# Patient Record
Sex: MALE | Race: WHITE | NOT HISPANIC OR LATINO | Employment: OTHER | URBAN - METROPOLITAN AREA
[De-identification: names, ages, dates, MRNs, and addresses within clinical notes are randomized per-mention and may not be internally consistent; named-entity substitution may affect disease eponyms.]

---

## 2024-03-15 ENCOUNTER — TELEPHONE (OUTPATIENT)
Dept: TRANSPLANT | Facility: CLINIC | Age: 59
End: 2024-03-15
Payer: MEDICARE

## 2024-03-15 NOTE — LETTER
March 15, 2024    Rosendo iNcholson  [address]  [address]  [facility] [address]  Phone: [phone]  Fax: [phone]          Dear Rosendo Nicholson:    Patient: Vimal Cruz   MR Number: [id number]   YOB: 1965     Thank you for the referral of Vimal Cruz to our lung transplant program. Your patients demographic and clinical information have been submitted for transplant provider review and financial clearance. Once the provider and insurance company has approved the consult for your patient, he will be contacted by our office re: the date for an appointment. We will update you once this initial appointment has been scheduled. You will receive an after-visit summary following the completion of your patients appointment in our clinic.    Thank you again for your trust in our program. If there is anything we can do for you or your staff, please feel free to contact us at [phone].    Sincerely,       Navya Brewer D.O.  Medical Director, Lung Transplant  Pulmonary & Critical Care Medicine    [facility]  [address] [place] [address]  [phone]

## 2024-03-19 ENCOUNTER — TELEPHONE (OUTPATIENT)
Dept: TRANSPLANT | Facility: CLINIC | Age: 59
End: 2024-03-19
Payer: MEDICARE

## 2024-03-19 DIAGNOSIS — J44.9 CHRONIC OBSTRUCTIVE PULMONARY DISEASE, UNSPECIFIED COPD TYPE: Primary | ICD-10-CM

## 2024-03-19 DIAGNOSIS — Z76.82 LUNG TRANSPLANT CANDIDATE: ICD-10-CM

## 2024-03-19 NOTE — TELEPHONE ENCOUNTER
"Received financial authorization from . Contacted patient about lung transplant referral, scheduling. Patient is interested and requests next available appointment. Offered , afternoon per patient request. Patient agreeable to this date. Patient is aware he will need to obtain 6MWT, and bring CD of CT chest to visit. Patient is aware that he will need to aim for a goal walk distance of 800 ft prior to being a candidate for lung transplant eval. Patient verbalized understanding. All questions answered at this time.  -----  Routed to  for authorization to proceed with consult, 6MWT.  ----- Message from Navya Brewer DO sent at 3/18/2024  8:28 AM CDT -----  Regarding: RE: LUT referral  Appropriate for LUT clinic.  Needs 6MWT to establish if he needs O2.  Will need a CD with CT images when he comes to clinic.  Thanks    ----- Message -----  From: Rochelle Suarez  Sent: 3/15/2024   4:49 PM CDT  To: Navya Brewer DO  Subject: LUT referral                                     Lung Transplant Referral Note     Referral from: Dr. Nicholson    Lung diagnosis: COPD     Age: 58 y.o. male    Height/Weight/BMI:  5' 10" / 177.5 lb / 80.5 kg  / 25.5 kg/m²     Smoking history: Social History     Tobacco Use       Smoking status: Former Smoker        Packs/day: 1        Years: 38        Pack years: 38        Types: Cigarettes        Quit date: 3/11/20         Years since quittin       Smokeless tobacco: Never User     Other Drug use: denies    PFT date: 3/8/24   FVC 3.19 (71.7%)  FEV1 1.35 (37.8%)  DLCO 12.45 (53.5%) TLC 7.22 (106%)     6 MWT date: 3/8/24   Distance 1242 ft / 378.56 meters. De-satted to 94% on room air with activity. Does not have portable Oxygen. # NOTE: [facility] records  note patient required 2L oxygen for use with 6MWT.      CXR date: 10/14/21 ([facility])    FINDINGS:   Hyperaeration with flattening of the hemidiaphragms and upper lung zone "   lucency compatible with COPD. No lobar consolidations, pleural   effusions, or pneumothorax     The heart is normal in size. Mediastinal contours are unremarkable.     ACDF hardware overlies the lower cervical spine, grossly intact where   seen. Multilevel anterior bony spurring within the thoracic spine. No   acute osseous abnormalities.     Chest CT date: 3/7/24   Findings:  Advanced emphysematous changes are evident. No evidence of malignancy. No discrete nodule is evident. Some bandlike scarring and discoid atelectatic changes noted  in the left upper lobe and in both anterior basilar lower lobes and the base of the right middle lobe. No evidence of pneumonia. No pleural or pericardial effusion. No adenopathy. Heart size normal. Coronary artery calcifications present.  Impression:   No evidence of malignancy, no nodules.     Echo date: 10/14/21 ([place])     Impression:   · Patient intermittently tachycardic during study.   · LVEF calculated as 73% using 2D biplane. LVEF is visually ~ 65 - 70%. LV   systolic function is normal. LV size is normal by linear dimension. LV   volume is normal. Normal LV wall thickness. LV wall motion normal. Normal   LV diastolic function.   · RV normal size. RV systolic function is normal.   · LA normal size.   · Tricuspid aortic valve with mildly thickened cusps. Aortic valve opens   well. No aortic regurgitation. No aortic valve stenosis.   · Normal right atrial pressure, estimated as 3 mmHg. Unable to assess RV   systolic pressure due to insufficient TR signal.   · Aortic root normal. Ascending aorta normal.   · No pericardial effusion.   · No prior TTE available for comparison.       Other pertinent medical history: History of a heart attack 2017 (has not had follow up); arthritis; atopic dermatitis on glans penis; severe itching in palms of hands. Established care 2/14/24 with Dr. Nicholson, previously established in [place] and [place], received Stem Cell Infusion per research study at  [facility].   No hypercapnia with ABG on room air (pCO2 30 - 3/8/24)    Recommendations?

## 2024-03-19 NOTE — LETTER
April 3, 2024    Rosendo Nicholson  [address]  [address]  [address]  [place] [address]  Phone: [phone]  Fax: [phone]     Gasper Hernandez  [address]  [facility] [address]  Phone: [phone]  Fax: [phone]        Dear Gasper Clark Vipul Hernandez:    Patient: Vimal Cruz   MR Number: [id number]   YOB: 1965     Thank you for the referral of Vimal Cruz to our lung transplant program. An initial appointment with the transplant team has been scheduled on April 22, 2024. You will receive an after-visit summary following the completion of your patients appointment in our clinic.    Thank you again for your trust in our program. If there is anything we can do for you or your staff, please feel free to contact us at [phone].    Sincerely,       Navya Brewer D.O.  Medical Director, Lung Transplant  Pulmonary & Critical Care Medicine    [facility]  [address] [place] [address]  [phone] tel  [phone] fax

## 2024-03-21 ENCOUNTER — OFFICE VISIT (OUTPATIENT)
Dept: FAMILY MEDICINE | Facility: CLINIC | Age: 59
End: 2024-03-21
Payer: MEDICARE

## 2024-03-21 ENCOUNTER — LAB VISIT (OUTPATIENT)
Dept: LAB | Facility: CLINIC | Age: 59
End: 2024-03-21
Payer: MEDICARE

## 2024-03-21 VITALS
DIASTOLIC BLOOD PRESSURE: 86 MMHG | BODY MASS INDEX: 24.72 KG/M2 | WEIGHT: 172.69 LBS | HEIGHT: 70 IN | OXYGEN SATURATION: 97 % | HEART RATE: 85 BPM | SYSTOLIC BLOOD PRESSURE: 130 MMHG

## 2024-03-21 DIAGNOSIS — J43.2 CENTRILOBULAR EMPHYSEMA: ICD-10-CM

## 2024-03-21 DIAGNOSIS — Z76.89 ENCOUNTER TO ESTABLISH CARE: ICD-10-CM

## 2024-03-21 DIAGNOSIS — Z12.5 PROSTATE CANCER SCREENING: ICD-10-CM

## 2024-03-21 DIAGNOSIS — E78.2 MIXED HYPERLIPIDEMIA: ICD-10-CM

## 2024-03-21 DIAGNOSIS — I10 PRIMARY HYPERTENSION: ICD-10-CM

## 2024-03-21 DIAGNOSIS — Z11.59 NEED FOR HEPATITIS C SCREENING TEST: ICD-10-CM

## 2024-03-21 DIAGNOSIS — Z11.4 ENCOUNTER FOR SCREENING FOR HIV: ICD-10-CM

## 2024-03-21 DIAGNOSIS — K42.9 UMBILICAL HERNIA WITHOUT OBSTRUCTION AND WITHOUT GANGRENE: ICD-10-CM

## 2024-03-21 DIAGNOSIS — J43.2 CENTRILOBULAR EMPHYSEMA: Primary | ICD-10-CM

## 2024-03-21 PROBLEM — I25.10 CAD (CORONARY ARTERY DISEASE): Status: ACTIVE | Noted: 2017-08-07

## 2024-03-21 PROBLEM — I25.2 HISTORY OF NON-ST ELEVATION MYOCARDIAL INFARCTION (NSTEMI): Status: ACTIVE | Noted: 2017-05-26

## 2024-03-21 PROBLEM — N13.8 BENIGN PROSTATIC HYPERPLASIA WITH URINARY OBSTRUCTION: Status: ACTIVE | Noted: 2017-08-11

## 2024-03-21 PROBLEM — F32.A CHRONIC DEPRESSION: Status: ACTIVE | Noted: 2018-04-20

## 2024-03-21 PROBLEM — N52.9 ERECTILE DYSFUNCTION: Status: ACTIVE | Noted: 2017-08-11

## 2024-03-21 PROBLEM — N40.1 BENIGN PROSTATIC HYPERPLASIA WITH URINARY OBSTRUCTION: Status: ACTIVE | Noted: 2017-08-11

## 2024-03-21 LAB
ALBUMIN SERPL BCP-MCNC: 4.1 G/DL (ref 3.5–5.2)
ALP SERPL-CCNC: 81 U/L (ref 55–135)
ALT SERPL W/O P-5'-P-CCNC: 21 U/L (ref 10–44)
ANION GAP SERPL CALC-SCNC: 10 MMOL/L (ref 8–16)
AST SERPL-CCNC: 17 U/L (ref 10–40)
BASOPHILS # BLD AUTO: 0.1 K/UL (ref 0–0.2)
BASOPHILS NFR BLD: 1.3 % (ref 0–1.9)
BILIRUB SERPL-MCNC: 0.8 MG/DL (ref 0.1–1)
BUN SERPL-MCNC: 13 MG/DL (ref 6–20)
CALCIUM SERPL-MCNC: 9.4 MG/DL (ref 8.7–10.5)
CHLORIDE SERPL-SCNC: 107 MMOL/L (ref 95–110)
CHOLEST SERPL-MCNC: 144 MG/DL (ref 120–199)
CHOLEST/HDLC SERPL: 3 {RATIO} (ref 2–5)
CO2 SERPL-SCNC: 23 MMOL/L (ref 23–29)
COMPLEXED PSA SERPL-MCNC: 1.4 NG/ML (ref 0–4)
CREAT SERPL-MCNC: 1.1 MG/DL (ref 0.5–1.4)
DIFFERENTIAL METHOD BLD: NORMAL
EOSINOPHIL # BLD AUTO: 0.1 K/UL (ref 0–0.5)
EOSINOPHIL NFR BLD: 1.4 % (ref 0–8)
ERYTHROCYTE [DISTWIDTH] IN BLOOD BY AUTOMATED COUNT: 13.3 % (ref 11.5–14.5)
EST. GFR  (NO RACE VARIABLE): >60 ML/MIN/1.73 M^2
GLUCOSE SERPL-MCNC: 96 MG/DL (ref 70–110)
HCT VFR BLD AUTO: 47.9 % (ref 40–54)
HDLC SERPL-MCNC: 48 MG/DL (ref 40–75)
HDLC SERPL: 33.3 % (ref 20–50)
HGB BLD-MCNC: 16.3 G/DL (ref 14–18)
IMM GRANULOCYTES # BLD AUTO: 0.01 K/UL (ref 0–0.04)
IMM GRANULOCYTES NFR BLD AUTO: 0.1 % (ref 0–0.5)
LDLC SERPL CALC-MCNC: 80.4 MG/DL (ref 63–159)
LYMPHOCYTES # BLD AUTO: 2.6 K/UL (ref 1–4.8)
LYMPHOCYTES NFR BLD: 33.1 % (ref 18–48)
MCH RBC QN AUTO: 30.9 PG (ref 27–31)
MCHC RBC AUTO-ENTMCNC: 34 G/DL (ref 32–36)
MCV RBC AUTO: 91 FL (ref 82–98)
MONOCYTES # BLD AUTO: 0.6 K/UL (ref 0.3–1)
MONOCYTES NFR BLD: 7.8 % (ref 4–15)
NEUTROPHILS # BLD AUTO: 4.5 K/UL (ref 1.8–7.7)
NEUTROPHILS NFR BLD: 56.3 % (ref 38–73)
NONHDLC SERPL-MCNC: 96 MG/DL
NRBC BLD-RTO: 0 /100 WBC
PLATELET # BLD AUTO: 309 K/UL (ref 150–450)
PMV BLD AUTO: 10.6 FL (ref 9.2–12.9)
POTASSIUM SERPL-SCNC: 4.3 MMOL/L (ref 3.5–5.1)
PROT SERPL-MCNC: 7.2 G/DL (ref 6–8.4)
RBC # BLD AUTO: 5.27 M/UL (ref 4.6–6.2)
SODIUM SERPL-SCNC: 140 MMOL/L (ref 136–145)
TRIGL SERPL-MCNC: 78 MG/DL (ref 30–150)
WBC # BLD AUTO: 7.95 K/UL (ref 3.9–12.7)

## 2024-03-21 PROCEDURE — 84153 ASSAY OF PSA TOTAL: CPT | Mod: TXP | Performed by: STUDENT IN AN ORGANIZED HEALTH CARE EDUCATION/TRAINING PROGRAM

## 2024-03-21 PROCEDURE — 99214 OFFICE O/P EST MOD 30 MIN: CPT | Mod: S$GLB,,, | Performed by: STUDENT IN AN ORGANIZED HEALTH CARE EDUCATION/TRAINING PROGRAM

## 2024-03-21 PROCEDURE — 80053 COMPREHEN METABOLIC PANEL: CPT | Mod: TXP | Performed by: STUDENT IN AN ORGANIZED HEALTH CARE EDUCATION/TRAINING PROGRAM

## 2024-03-21 PROCEDURE — 87389 HIV-1 AG W/HIV-1&-2 AB AG IA: CPT | Mod: TXP | Performed by: STUDENT IN AN ORGANIZED HEALTH CARE EDUCATION/TRAINING PROGRAM

## 2024-03-21 PROCEDURE — 36415 COLL VENOUS BLD VENIPUNCTURE: CPT | Mod: TXP,,, | Performed by: STUDENT IN AN ORGANIZED HEALTH CARE EDUCATION/TRAINING PROGRAM

## 2024-03-21 PROCEDURE — 80061 LIPID PANEL: CPT | Mod: TXP | Performed by: STUDENT IN AN ORGANIZED HEALTH CARE EDUCATION/TRAINING PROGRAM

## 2024-03-21 PROCEDURE — 86803 HEPATITIS C AB TEST: CPT | Mod: TXP | Performed by: STUDENT IN AN ORGANIZED HEALTH CARE EDUCATION/TRAINING PROGRAM

## 2024-03-21 PROCEDURE — 85025 COMPLETE CBC W/AUTO DIFF WBC: CPT | Mod: TXP | Performed by: STUDENT IN AN ORGANIZED HEALTH CARE EDUCATION/TRAINING PROGRAM

## 2024-03-21 RX ORDER — ATORVASTATIN CALCIUM 20 MG/1
20 TABLET, FILM COATED ORAL
COMMUNITY
Start: 2023-03-01 | End: 2024-04-24 | Stop reason: ALTCHOICE

## 2024-03-21 RX ORDER — FLUTICASONE FUROATE AND VILANTEROL TRIFENATATE 100; 25 UG/1; UG/1
1 POWDER RESPIRATORY (INHALATION) DAILY
COMMUNITY
Start: 2023-10-01 | End: 2024-04-30

## 2024-03-21 RX ORDER — RAMIPRIL 2.5 MG/1
2.5 CAPSULE ORAL DAILY
COMMUNITY
Start: 2017-05-20

## 2024-03-21 RX ORDER — DILTIAZEM HYDROCHLORIDE 120 MG/1
120 CAPSULE, EXTENDED RELEASE ORAL
COMMUNITY
Start: 2017-05-20 | End: 2024-03-26

## 2024-03-21 RX ORDER — BUSPIRONE HYDROCHLORIDE 15 MG/1
7.5 TABLET ORAL 3 TIMES DAILY PRN
COMMUNITY
Start: 2023-10-01

## 2024-03-21 RX ORDER — AZELASTINE 1 MG/ML
2 SPRAY, METERED NASAL NIGHTLY PRN
COMMUNITY
Start: 2023-10-01

## 2024-03-21 NOTE — PROGRESS NOTES
"  [facility]  [address]  [place] [address]    Subjective     Patient ID: Vimal Cruz is a 58 y.o. male who comes to the clinic to establish care.    Chief Complaint: Establish Care    HTN - on ramipril and diltiazem    DLD - on atorvastatin    COPD - on breo-ellipta 1 puff daily, has rescue inhaler, sees pulmonology w/ Dr. Pathak w/ [facility]. Has been told by him that he will refer the patient to [facility] for a possible lung transplant. He isnt on any supplemental O2 or other inhalers so I'm not sure why he was told this, he says he might want another pulmnologist    Anixety - takes buspar 15mg    History of NSTEMI - happened years ago in 2017, on statin but not baby aspirin    CAD - on statin    ROS negative unless stated above       Objective     Vitals:    03/21/24 1111   BP: 130/86   BP Location: Left arm   Patient Position: Sitting   BP Method: Large (Manual)   Pulse: 85   SpO2: 97%   Weight: 78.3 kg (172 lb 11.2 oz)   Height: 5' 10" (1.778 m)        Wt Readings from Last 3 Encounters:   03/21/24 1111 78.3 kg (172 lb 11.2 oz)        Physical Exam  Vitals reviewed.   Constitutional:       Appearance: Normal appearance.   HENT:      Head: Normocephalic and atraumatic.   Eyes:      Extraocular Movements: Extraocular movements intact.      Pupils: Pupils are equal, round, and reactive to light.   Cardiovascular:      Rate and Rhythm: Normal rate.      Pulses: Normal pulses.      Heart sounds: Normal heart sounds.   Pulmonary:      Effort: Pulmonary effort is normal. No respiratory distress.      Breath sounds: Normal breath sounds.   Musculoskeletal:         General: Normal range of motion.      Cervical back: Normal range of motion and neck supple.   Skin:     General: Skin is dry.      Capillary Refill: Capillary refill takes less than 2 seconds.   Neurological:      General: No focal deficit present.      Mental Status: He is " alert and oriented to person, place, and time. Mental status is at baseline.   Psychiatric:         Mood and Affect: Mood normal.         Behavior: Behavior normal.         Thought Content: Thought content normal.         Current Outpatient Medications   Medication Instructions    atorvastatin (LIPITOR) 20 mg    azelastine (ASTELIN) 137 mcg (0.1 %) nasal spray 2 sprays    BREO ELLIPTA 100-25 mcg/dose diskus inhaler 1 puff    busPIRone (BUSPAR) 15 mg    diltiaZEM HCl (TIAZAC) 120 mg, Oral    ramipriL (ALTACE) 2.5 mg, Daily           Assessment and Plan     1. Centrilobular emphysema  -     Comprehensive metabolic panel; Future; Expected date: 03/21/2024  -     CBC auto differential; Future; Expected date: 03/21/2024    2. Encounter to establish care    3. Mixed hyperlipidemia  -     Lipid panel; Future; Expected date: 03/21/2024    4. Prostate cancer screening  -     PSA, Screening; Future; Expected date: 03/21/2024    5. Encounter for screening for HIV  -     HIV 1/2 Ag/Ab (4th Gen); Future; Expected date: 03/21/2024    6. Need for hepatitis C screening test  -     Hepatitis C antibody; Future; Expected date: 03/21/2024    7. Primary hypertension    8. Umbilical hernia without obstruction and without gangrene  -     Ambulatory referral/consult to General Surgery; Future; Expected date: 03/28/2024        Here to establish care    For HTN, continue BP regimen    For DLD, continue statin    For COPD, stable today, may request a new pulmnologist as he doesn't think he needs a lung transplant    Referring to general surgery for umbilical hernia    Labs today as above    RTC in 6 months         I encouraged the patient to take all medications as prescribed and to keep follow up appointments with their providers. Patient stated they had no other concerns. Questions were invited and answered. Follow up sooner if needed.     Myron Duarte MD  03/21/2024 11:03 AM

## 2024-03-22 LAB
HCV AB SERPL QL IA: NORMAL
HIV 1+2 AB+HIV1 P24 AG SERPL QL IA: NORMAL

## 2024-03-26 ENCOUNTER — OFFICE VISIT (OUTPATIENT)
Dept: SURGERY | Facility: CLINIC | Age: 59
End: 2024-03-26
Payer: MEDICARE

## 2024-03-26 VITALS
DIASTOLIC BLOOD PRESSURE: 93 MMHG | SYSTOLIC BLOOD PRESSURE: 130 MMHG | BODY MASS INDEX: 24.62 KG/M2 | WEIGHT: 172 LBS | HEART RATE: 69 BPM | HEIGHT: 70 IN

## 2024-03-26 DIAGNOSIS — J43.2 CENTRILOBULAR EMPHYSEMA: Primary | ICD-10-CM

## 2024-03-26 DIAGNOSIS — K42.9 UMBILICAL HERNIA WITHOUT OBSTRUCTION AND WITHOUT GANGRENE: ICD-10-CM

## 2024-03-26 PROCEDURE — 99999 PR PBB SHADOW E&M-EST. PATIENT-LVL IV: CPT | Mod: PBBFAC,TXP,, | Performed by: SPECIALIST

## 2024-03-26 PROCEDURE — 99214 OFFICE O/P EST MOD 30 MIN: CPT | Mod: PBBFAC,NTX | Performed by: SPECIALIST

## 2024-03-26 PROCEDURE — 99203 OFFICE O/P NEW LOW 30 MIN: CPT | Mod: S$PBB,NTX,, | Performed by: SPECIALIST

## 2024-03-26 RX ORDER — DILTIAZEM HYDROCHLORIDE 120 MG/1
120 CAPSULE, COATED, EXTENDED RELEASE ORAL NIGHTLY
COMMUNITY
Start: 2024-02-19

## 2024-03-26 NOTE — PROGRESS NOTES
"Subjective     Patient ID: Vimal Cruz  is a 58 y.o. male     Chief Complaint:   Chief Complaint   Patient presents with    Hernia     Umbilical      Vitals:    03/26/24 1257   BP: (!) 130/93   Pulse: 69   Weight: 78 kg (172 lb)   Height: 5' 10" (1.778 m)       HPI     Hernia     Additional comments: Umbilical          Last edited by Brittney Ly LPN on 3/26/2024 12:57 PM.      Very pleasant 58-year-old male who presents with a reducible small umbilical hernia states has been present for several years.  Not causing significant pain or discomfort but causes discomfort when it is pressed upon.  He reports it is easily reducible.  No previous surgical history on his abdomen.  Patient has a significant history of coronary and pulmonary disease.  Reports he has had myocardial infarction in the past but no stenting.  He is significant smoker in the past but quit smoking several years ago.  Positive 40 pack year history.  Also has significant pulmonary history and was under care of pulmonologist in [place] and is followed by Pulmonary here on the [facility].  By history he reports he was referred for evaluation by Pulmonary transplant.  Referred to Surgical Services for evaluation for repair of his umbilical hernia elective repair.  Past Medical History:   Diagnosis Date    Hypertension      Past Surgical History:   Procedure Laterality Date    HERNIA REPAIR       Family History   Problem Relation Age of Onset    Dementia Mother     Emphysema Maternal Grandmother     Throat cancer Maternal Grandfather     Dementia Paternal Grandmother     Stroke Paternal Grandfather      Past Surgical History:   Procedure Laterality Date    HERNIA REPAIR       Social History     Socioeconomic History    Marital status:    Tobacco Use    Smoking status: Never    Smokeless tobacco: Never   Substance and Sexual Activity    Alcohol use: Not Currently    Drug use: Never    Sexual activity: Yes     Partners: Female        Current " Outpatient Medications:     atorvastatin (LIPITOR) 20 MG tablet, 20 mg., Disp: , Rfl:     azelastine (ASTELIN) 137 mcg (0.1 %) nasal spray, 2 sprays., Disp: , Rfl:     BREO ELLIPTA 100-25 mcg/dose diskus inhaler, 1 puff., Disp: , Rfl:     busPIRone (BUSPAR) 15 MG tablet, 15 mg., Disp: , Rfl:     diltiaZEM (CARDIZEM CD) 120 MG Cp24, Take 120 mg by mouth., Disp: , Rfl:     ramipriL (ALTACE) 2.5 MG capsule, 2.5 mg once daily., Disp: , Rfl:    Review of patient's allergies indicates:   Allergen Reactions    Amoxicillin     Penicillin             Review of Systems   Constitutional: Negative.    HENT: Negative.     Eyes: Negative.    Respiratory:  Positive for shortness of breath.    Cardiovascular: Negative.    Gastrointestinal: Negative.    Genitourinary: Negative.    Musculoskeletal: Negative.         Positive umbilical hernia no change in bowel or bladder habits   Skin: Negative.    Neurological: Negative.    Endo/Heme/Allergies: Negative.    Psychiatric/Behavioral: Negative.        I have reviewed the following:     Details / Date    []   Labs     []   Micro     []   Pathology     []   Imaging     []   Cardiology Procedures     [x]   Other Evaluation of primary care and referral notes        Objective         Physical Exam  Vitals and nursing note reviewed.   Constitutional:       Appearance: Normal appearance. He is normal weight.   HENT:      Head: Normocephalic and atraumatic.      Nose: Nose normal.      Mouth/Throat:      Mouth: Mucous membranes are moist.   Eyes:      Extraocular Movements: Extraocular movements intact.      Pupils: Pupils are equal, round, and reactive to light.   Cardiovascular:      Rate and Rhythm: Normal rate.   Pulmonary:      Effort: Pulmonary effort is normal.   Abdominal:      General: Abdomen is flat.      Palpations: Abdomen is soft.      Hernia: A hernia is present.          Comments: Easily reducible small umbilical hernia   Musculoskeletal:         General: Normal range of motion.       Cervical back: Normal range of motion.   Skin:     General: Skin is warm.   Neurological:      General: No focal deficit present.      Mental Status: He is alert and oriented to person, place, and time. Mental status is at baseline.   Psychiatric:         Mood and Affect: Mood normal.         Behavior: Behavior normal.        Assessment and Plan     1. Umbilical hernia without obstruction and without gangrene  -     Ambulatory referral/consult to General Surgery       No orders of the defined types were placed in this encounter.     Patient is a 58-year-old male with significant past coronary and pulmonary disease who presents with a reducible umbilical hernia causing mild discomfort.  Discussed risks and benefits of surgery as well as inherent risks secondary to his underlying cardiac and pulmonary issues.  Recommended referral to both Cardiology and Pulmonary for perioperative risk assessment.  Patient is to return to clinic following completion of those evaluations by the 2 referrals.  Discussed outpatient surgical procedure laparoscopic for repair of this umbilical hernia.  Risks and benefits have been explained to the patient he states he understands.  He agrees with the current plan of referral for perioperative risk assessment by the 2 referrals subspecialist.  An After Visit Summary was printed and given to the patient.       Roel Bonilla MD  [facility] 2[facility] General [facility]  [address] [place] [address],[place] [address]  [phone]     This note was created using [employer] direct voice recognition software. Note may have occasional typographical errors that may not have been identified and edited despite initial review prior to signing.

## 2024-04-03 ENCOUNTER — TELEPHONE (OUTPATIENT)
Dept: CARDIOLOGY | Facility: CLINIC | Age: 59
End: 2024-04-03
Payer: MEDICARE

## 2024-04-03 NOTE — TELEPHONE ENCOUNTER
----- Message from Starla Thomason sent at 4/3/2024  3:02 PM CDT -----  Contact: self  Type:  Needs Medical Advice, SOONER APPT REQUEST    Who Called: self  Symptoms (please be specific): is requesting to se seen sooner than June, pt had sadia ppt on April 10th but had to cancel..    Would the patient rather a call back or a response via [facility]? call  Best Call Back Number: [phone] (home) [phone] (work)    Additional Information: please advise and thank you.

## 2024-04-04 ENCOUNTER — TELEPHONE (OUTPATIENT)
Dept: CARDIOLOGY | Facility: CLINIC | Age: 59
End: 2024-04-04
Payer: MEDICARE

## 2024-04-04 NOTE — TELEPHONE ENCOUNTER
----- Message from Aleta Howell sent at 4/4/2024  8:21 AM CDT -----  Contact: Patient  Type:  Patient Returning Call    Who Called:  Patient  Who Left Message for Patient:  Milla  Does the patient know what this is regarding?:  Sooner appointment    Would the patient rather a call back or a response via [facility]?   Call back  Best Call Back Number:  [phone]    Additional Information:   States he is returning a missed call - please call back - thank you

## 2024-04-04 NOTE — TELEPHONE ENCOUNTER
Spoke to pt unable to unable to give sooner appointment.  Instructed to ER for any emergency conditions,   verbalized understanding.

## 2024-04-18 ENCOUNTER — OFFICE VISIT (OUTPATIENT)
Dept: FAMILY MEDICINE | Facility: CLINIC | Age: 59
End: 2024-04-18
Payer: MEDICARE

## 2024-04-18 VITALS
DIASTOLIC BLOOD PRESSURE: 78 MMHG | SYSTOLIC BLOOD PRESSURE: 134 MMHG | BODY MASS INDEX: 24.62 KG/M2 | OXYGEN SATURATION: 98 % | HEART RATE: 73 BPM | RESPIRATION RATE: 20 BRPM | WEIGHT: 172 LBS | HEIGHT: 70 IN

## 2024-04-18 DIAGNOSIS — J43.2 CENTRILOBULAR EMPHYSEMA: Primary | ICD-10-CM

## 2024-04-18 DIAGNOSIS — I25.10 CORONARY ARTERY DISEASE, UNSPECIFIED VESSEL OR LESION TYPE, UNSPECIFIED WHETHER ANGINA PRESENT, UNSPECIFIED WHETHER NATIVE OR TRANSPLANTED HEART: ICD-10-CM

## 2024-04-18 DIAGNOSIS — E78.2 MIXED HYPERLIPIDEMIA: ICD-10-CM

## 2024-04-18 DIAGNOSIS — I10 PRIMARY HYPERTENSION: ICD-10-CM

## 2024-04-18 PROCEDURE — 99214 OFFICE O/P EST MOD 30 MIN: CPT | Mod: S$GLB,,, | Performed by: STUDENT IN AN ORGANIZED HEALTH CARE EDUCATION/TRAINING PROGRAM

## 2024-04-18 RX ORDER — CEFDINIR 300 MG/1
300 CAPSULE ORAL 2 TIMES DAILY
Qty: 10 CAPSULE | Refills: 0 | Status: SHIPPED | OUTPATIENT
Start: 2024-04-18 | End: 2024-04-23

## 2024-04-18 RX ORDER — PREDNISONE 20 MG/1
20 TABLET ORAL DAILY
Qty: 5 TABLET | Refills: 0 | Status: SHIPPED | OUTPATIENT
Start: 2024-04-18 | End: 2024-04-29

## 2024-04-18 NOTE — PROGRESS NOTES
"  [facility]    [facility]  [address]  [place] [address]    Subjective     Patient ID: Vimal Cruz is a 58 y.o. male who comes to the clinic for a follow up visit.    Chief Complaint: Establish Care (C/O sinus infection /sob)    Cough - about a week ago, he started to get really nasal drainage. In the last few days he's had a sore throat and chest burning. No fever or worsening SOB.     HTN - on ramipril and diltiazem     DLD - on atorvastatin     COPD - on breo-ellipta 1 puff daily, has rescue inhaler, sees pulmonology w/ Dr. Pathak w/ [facility] but is set to see a new pulmonologist in [place]. Has been told by him that he will refer the patient to [facility] for a possible lung transplant. He isnt on any supplemental O2 or other inhalers so I'm not sure why he was told this     Anixety - takes buspar 15mg     History of NSTEMI - happened years ago in 2017, on statin but not baby aspirin     CAD - on statin    ROS negative unless stated above       Objective     Vitals:    04/18/24 1135   BP: 134/78   BP Location: Right arm   Patient Position: Sitting   BP Method: Medium (Manual)   Pulse: 73   Resp: 20   SpO2: 98%   Weight: 78 kg (172 lb)   Height: 5' 10" (1.778 m)        Wt Readings from Last 3 Encounters:   04/18/24 1135 78 kg (172 lb)   03/26/24 1257 78 kg (172 lb)   03/21/24 1111 78.3 kg (172 lb 11.2 oz)        Physical Exam  Vitals reviewed.   Constitutional:       Appearance: Normal appearance.   HENT:      Head: Normocephalic and atraumatic.   Eyes:      Extraocular Movements: Extraocular movements intact.      Pupils: Pupils are equal, round, and reactive to light.   Cardiovascular:      Rate and Rhythm: Normal rate.      Pulses: Normal pulses.      Heart sounds: Normal heart sounds.   Pulmonary:      Effort: Pulmonary effort is normal. No respiratory distress.      Breath sounds: Normal breath sounds.   Musculoskeletal:         General: " Normal range of motion.      Cervical back: Normal range of motion and neck supple.   Skin:     General: Skin is dry.      Capillary Refill: Capillary refill takes less than 2 seconds.   Neurological:      General: No focal deficit present.      Mental Status: He is alert and oriented to person, place, and time. Mental status is at baseline.   Psychiatric:         Mood and Affect: Mood normal.         Behavior: Behavior normal.         Thought Content: Thought content normal.         Current Outpatient Medications   Medication Instructions    atorvastatin (LIPITOR) 20 mg    azelastine (ASTELIN) 137 mcg (0.1 %) nasal spray 2 sprays    BREO ELLIPTA 100-25 mcg/dose diskus inhaler 1 puff    busPIRone (BUSPAR) 15 mg    cefdinir (OMNICEF) 300 mg, Oral, 2 times daily    diltiaZEM (CARDIZEM CD) 120 mg, Oral    predniSONE (DELTASONE) 20 mg, Oral, Daily    ramipriL (ALTACE) 2.5 mg, Daily           Assessment and Plan     1. Centrilobular emphysema  -     cefdinir (OMNICEF) 300 MG capsule; Take 1 capsule (300 mg total) by mouth 2 (two) times daily. for 5 days  Dispense: 10 capsule; Refill: 0  -     predniSONE (DELTASONE) 20 MG tablet; Take 1 tablet (20 mg total) by mouth once daily.  Dispense: 5 tablet; Refill: 0    2. Primary hypertension    3. Mixed hyperlipidemia    4. Coronary artery disease, unspecified vessel or lesion type, unspecified whether angina present, unspecified whether native or transplanted heart        Here for follow up    Treating COPD exacerbation w/ cefdinir and prednisone.     For HTN, at goal today, continue current meds    For DLD, continue statin    For COPD, continue chronic meds as is, has f/u w/ lung transplant team on the 4/22 w/ Dr. Brewer and then new pulmonologist on 4/30 w/ Dr. Hernandez    RTC in 5 months for regularly scheduled visit or sooner if needed         I encouraged the patient to take all medications as prescribed and to keep follow up appointments with their providers. Patient  stated they had no other concerns. Questions were invited and answered. Follow up sooner if needed.     Myron Duarte MD  04/18/2024 11:43 AM

## 2024-04-19 ENCOUNTER — TELEPHONE (OUTPATIENT)
Dept: TRANSPLANT | Facility: CLINIC | Age: 59
End: 2024-04-19
Payer: MEDICARE

## 2024-04-22 ENCOUNTER — OFFICE VISIT (OUTPATIENT)
Dept: TRANSPLANT | Facility: CLINIC | Age: 59
End: 2024-04-22
Payer: MEDICARE

## 2024-04-22 ENCOUNTER — HOSPITAL ENCOUNTER (OUTPATIENT)
Dept: PULMONOLOGY | Facility: CLINIC | Age: 59
Discharge: HOME | End: 2024-04-22
Payer: MEDICARE

## 2024-04-22 VITALS
OXYGEN SATURATION: 97 % | BODY MASS INDEX: 25.48 KG/M2 | WEIGHT: 172 LBS | TEMPERATURE: 99 F | DIASTOLIC BLOOD PRESSURE: 90 MMHG | RESPIRATION RATE: 16 BRPM | HEIGHT: 69 IN | SYSTOLIC BLOOD PRESSURE: 157 MMHG | HEART RATE: 74 BPM

## 2024-04-22 VITALS — BODY MASS INDEX: 25.48 KG/M2 | WEIGHT: 172 LBS | HEIGHT: 69 IN

## 2024-04-22 DIAGNOSIS — Z76.82 LUNG TRANSPLANT CANDIDATE: ICD-10-CM

## 2024-04-22 DIAGNOSIS — J44.9 CHRONIC OBSTRUCTIVE PULMONARY DISEASE, UNSPECIFIED COPD TYPE: Primary | ICD-10-CM

## 2024-04-22 DIAGNOSIS — J44.9 CHRONIC OBSTRUCTIVE PULMONARY DISEASE, UNSPECIFIED COPD TYPE: ICD-10-CM

## 2024-04-22 PROCEDURE — 99204 OFFICE O/P NEW MOD 45 MIN: CPT | Mod: 25,S$PBB,TXP, | Performed by: PHYSICIAN ASSISTANT

## 2024-04-22 PROCEDURE — 99999 PR PBB SHADOW E&M-EST. PATIENT-LVL IV: CPT | Mod: PBBFAC,TXP,, | Performed by: PHYSICIAN ASSISTANT

## 2024-04-22 PROCEDURE — 94618 PULMONARY STRESS TESTING: CPT | Mod: 26,S$PBB,TXP, | Performed by: INTERNAL MEDICINE

## 2024-04-22 PROCEDURE — 94618 PULMONARY STRESS TESTING: CPT | Mod: PBBFAC,TXP | Performed by: INTERNAL MEDICINE

## 2024-04-22 PROCEDURE — 99214 OFFICE O/P EST MOD 30 MIN: CPT | Mod: PBBFAC,TXP,25 | Performed by: PHYSICIAN ASSISTANT

## 2024-04-22 RX ORDER — CETIRIZINE HYDROCHLORIDE 10 MG/1
10 TABLET, CHEWABLE ORAL NIGHTLY
COMMUNITY

## 2024-04-22 RX ORDER — FLUTICASONE PROPIONATE 50 MCG
1 SPRAY, SUSPENSION (ML) NASAL DAILY
COMMUNITY
End: 2024-04-29 | Stop reason: SDUPTHER

## 2024-04-22 RX ORDER — FLUTICASONE PROPIONATE AND SALMETEROL 250; 50 UG/1; UG/1
1 POWDER RESPIRATORY (INHALATION) 2 TIMES DAILY
COMMUNITY
Start: 2024-04-18 | End: 2024-04-30

## 2024-04-22 NOTE — LETTER
April 22, 2024        Rosendo Nicholson  [address]  [address]  [address]  [place] [address]  Phone: [phone]  Fax: [phone]             [facility] 1st Fl  [address]  [facility] [address]  Phone: [phone]   Patient: Vimal Cruz   MR Number: [id number]   YOB: 1965   Date of Visit: 4/22/2024       Dear Dr. Rosendo Nicholson    Thank you for referring Vimal Cruz to me for evaluation. Attached you will find relevant portions of my assessment and plan of care.    If you have questions, please do not hesitate to call me. I look forward to following Vimal Cruz along with you.    Sincerely,    RAO Campos    If you would like to receive this communication electronically, please contact [email or web address] or [phone] to request [employer] Link access.    [employer] is a tool which provides read-only access to select patient information with whom you have a relationship. Its easy to use and provides real time access to review your patients record including encounter summaries, notes, results, and demographic information.    If you feel you have received this communication in error or would no longer like to receive these types of communications, please e-mail [email or web address]

## 2024-04-22 NOTE — PROCEDURES
Vimal Cruz is a 58 y.o.  male patient, who presents for a 6 minute walk test ordered by DO Osman.  The diagnosis is Pre Lung Transplant Evaluation, COPD/Emphysema.  The patient's BMI is 25.4 kg/m2.  Predicted distance (lower limit of normal) is 428.11 meters.      Test Results:    The test was completed without stopping.  The total time walked was 360 seconds.  During walking, the patient reported:  Dyspnea. The patient used no assistive devices during testing.     04/22/2024---------Distance: 426.72 meters (1400 feet)     Lap Walk Time O2 Sat % Supplemental Oxygen Heart Rate Blood Pressure Selena Scale   Pre-exercise  (Resting) 0 0 98 % Room Air 74 bpm 158/88 mmHg 0.5   During Exercise 1 51 sec 98 % Room Air 101 bpm     During Exercise 2 100 sec 94 % Room Air 104 bpm     During Exercise 3 150 sec 92 % Room Air 111 bpm     During Exercise 4 200 sec 93 % Room Air 110 bpm     During Exercise 5 260 sec 93 % Room Air 112 bpm     During Exercise 6 302 sec 93 % Room Air 114 bpm     End of Exercise 7 360 sec 93 % Room Air 118 bpm 195/95 mmHg 4   Post-exercise  (Recovery)   97 % Room Air  83 bpm 167/97 mmHg      Recovery Time: 103 seconds    Performing nurse/tech: Yasmeen NANCE      PREVIOUS STUDY:   The patient has not had a previous study.      CLINICAL INTERPRETATION:  Six minute walk distance is 426.72 meters (1400 feet) with somewhat heavy dyspnea.  During exercise, there was significant desaturation while breathing room air.  Both blood pressure and heart rate increased significantly with walking.  Hypertension was present prior to exercise.  The patient did not report non-pulmonary symptoms during exercise.  No previous study performed.  Based upon age and body mass index, exercise capacity is less than predicted.

## 2024-04-22 NOTE — PROGRESS NOTES
"LUNG TRANSPLANT INITIAL EVALUATION                                                                                                                                             Reason for Visit:  Evaluation for lung transplant    Referring Physician: Rosendo Nicholson MD    History of Present Illness: Vimal Cruz is a 58 y.o. male who is on 0L of oxygen.  He is on no assisted ventilation.  His New York Heart Association Class is I and a Karnofsky score of 90% - Able to carry on normal activity: minor symptoms of disease. He is not diabetic.    Requires Supplemental O2: No    Massive Hemoptysis: 0 occurrences  (Enter the number of times in the last year)    Exacerbations: 0 occurrences  (Enter the number of times in the last year)    Microbiology Infections: No    Patient presents today for evaluation of lung transplant candidacy. He has a history of COPD based upon his PFTs and imaging. Has followed with Dr. Nicholson and is on Breo, ICS/LABA for management of his COPD. He states he has had progressive fatigue and has been performing his ADLs slower than previous. No history of exacerbations, hospitalizations, ICU admissions. No prior lung biopsies or chest surgeries. Reported that he was prescribed oxygen by a pulmonologist at OSH about two years prior, but never used it.     Patient is a former smoker with 30 pack year history. Quit 4 years ago. He has a history of MI in 2017 (no intervention per patient). No cardiology follow up. He states he is awaiting cardiology clearance for possible procedure. No other known past medical history. Previously worked as a  and with refrigerators. Unclear occupational exposure history in the past. Patient states he has a lot of anxiety related to his lung disease. Independent with his ADLs and remains very active. Walks his dog and cycles most days.     Past Medical History:   Diagnosis Date    Anxiety     Arthritis     "degenerative bone disease"    COPD " [facility] 6 months w/ labs (CBC, CMP, TSH) and CT CAP.    Girish Sauer MD.   (chronic obstructive pulmonary disease)     Emphysema of lung     Hypertension        Past Surgical History:   Procedure Laterality Date    ANTERIOR SPINAL FUSION  2016    c5    HERNIA REPAIR         Allergies: Amoxicillin and Penicillin    Current Outpatient Medications   Medication Sig Dispense Refill    atorvastatin (LIPITOR) 20 MG tablet 20 mg.      azelastine (ASTELIN) 137 mcg (0.1 %) nasal spray 2 sprays by Nasal route nightly as needed for Rhinitis.      BREO ELLIPTA 100-25 mcg/dose diskus inhaler Inhale 1 puff into the lungs once daily.      busPIRone (BUSPAR) 15 MG tablet Take 7.5 mg by mouth 3 (three) times daily as needed.      cefdinir (OMNICEF) 300 MG capsule Take 1 capsule (300 mg total) by mouth 2 (two) times daily. for 5 days 10 capsule 0    cetirizine 10 mg chewable tablet Take 10 mg by mouth every evening.      diltiaZEM (CARDIZEM CD) 120 MG Cp24 Take 120 mg by mouth every evening.      fluticasone propionate (FLONASE) 50 mcg/actuation nasal spray 1 spray by Each Nostril route once daily.      predniSONE (DELTASONE) 20 MG tablet Take 1 tablet (20 mg total) by mouth once daily. (Patient taking differently: Take 20 mg by mouth once daily. Weaning to 10 mg and will stop tomorrow - provider prescribed for flare) 5 tablet 0    ramipriL (ALTACE) 2.5 MG capsule 2.5 mg once daily.      WIXELA INHUB 250-50 mcg/dose diskus inhaler Inhale 1 puff into the lungs 2 (two) times daily. Is not taking - makes him hoarse after a week of use.       No current facility-administered medications for this visit.         There is no immunization history on file for this patient.  Family History:    Family History   Problem Relation Name Age of Onset    Dementia Mother      Stroke Mother  77        after stopping zyprexa    Emphysema Maternal Grandmother  56    Throat cancer Maternal Grandfather      Cancer Maternal Grandfather  87        colon    Dementia Paternal Grandmother      Stroke Paternal Grandfather       Social  History     Substance and Sexual Activity   Alcohol Use Not Currently    Comment: socially      Social History     Substance and Sexual Activity   Drug Use Never      Social History     Socioeconomic History    Marital status:    Tobacco Use    Smoking status: Former     Average packs/day: 1 pack/day for 41.3 years (41.3 ttl pk-yrs)     Types: Cigarettes     Start date: 11/21/1978    Smokeless tobacco: Never   Substance and Sexual Activity    Alcohol use: Not Currently     Comment: socially    Drug use: Never    Sexual activity: Yes     Partners: Female     Social Determinants of Health     Financial Resource Strain: Low Risk  (4/15/2024)    Overall Financial Resource Strain (CARDIA)     Difficulty of Paying Living Expenses: Not hard at all   Food Insecurity: No Food Insecurity (4/15/2024)    Hunger Vital Sign     Worried About Running Out of Food in the Last Year: Never true     Ran Out of Food in the Last Year: Never true   Transportation Needs: No Transportation Needs (4/15/2024)    PRAPARE - Transportation     Lack of Transportation (Medical): No     Lack of Transportation (Non-Medical): No   Physical Activity: Sufficiently Active (4/15/2024)    Exercise Vital Sign     Days of Exercise per Week: 6 days     Minutes of Exercise per Session: 30 min   Stress: No Stress Concern Present (4/15/2024)    [employer] - Occupational Stress Questionnaire     Feeling of Stress : Only a little   Social Connections: Unknown (4/15/2024)    Social Connection and Isolation Panel [NHANES]     Frequency of Communication with Friends and Family: Three times a week     Frequency of Social Gatherings with Friends and Family: More than three times a week     Active Member of Clubs or Organizations: No     Attends Club or Organization Meetings: Never     Marital Status:    Housing Stability: Unknown (4/15/2024)    Housing Stability Vital Sign     Unable to Pay for Housing in the Last Year: No  "    Review of Systems   Constitutional:  Negative for chills, diaphoresis, fever, malaise/fatigue and weight loss.   HENT:  Negative for congestion, ear discharge, ear pain, hearing loss, nosebleeds, sinus pain, sore throat and tinnitus.    Eyes:  Negative for blurred vision, double vision, photophobia, pain, discharge and redness.   Respiratory:  Positive for shortness of breath (very heavy exertion). Negative for cough, hemoptysis, sputum production, wheezing and stridor.    Cardiovascular:  Negative for chest pain, palpitations, orthopnea, claudication, leg swelling and PND.   Gastrointestinal:  Negative for abdominal pain, blood in stool, constipation, diarrhea, heartburn, melena, nausea and vomiting.   Genitourinary:  Negative for dysuria, flank pain, frequency, hematuria and urgency.   Musculoskeletal:  Negative for back pain, falls, joint pain, myalgias and neck pain.   Skin:  Negative for itching and rash.   Neurological:  Negative for dizziness, tingling, tremors, sensory change, speech change, focal weakness, seizures, loss of consciousness, weakness and headaches.   Endo/Heme/Allergies:  Negative for environmental allergies and polydipsia. Does not bruise/bleed easily.   Psychiatric/Behavioral:  Negative for depression, hallucinations, memory loss, substance abuse and suicidal ideas. The patient is not nervous/anxious and does not have insomnia.      Vitals  BP (!) 157/90 (BP Location: Left arm, Patient Position: Sitting, BP Method: Medium (Automatic))   Pulse 74   Temp 98.9 °F (37.2 °C) (Oral)   Resp 16   Ht 5' 9" (1.753 m)   Wt 78 kg (172 lb)   SpO2 97% Comment: room air  BMI 25.40 kg/m²   Physical Exam  Vitals and nursing note reviewed.   Constitutional:       General: He is not in acute distress.     Appearance: He is normal weight. He is not ill-appearing.   HENT:      Head: Normocephalic and atraumatic.      Nose: Nose normal. No congestion or rhinorrhea.   Eyes:      General: No scleral " icterus.     Extraocular Movements: Extraocular movements intact.      Conjunctiva/sclera: Conjunctivae normal.   Cardiovascular:      Rate and Rhythm: Normal rate.   Pulmonary:      Effort: Pulmonary effort is normal. No respiratory distress.      Breath sounds: No stridor. No wheezing, rhonchi or rales.   Abdominal:      General: Abdomen is flat. Bowel sounds are normal. There is no distension.      Palpations: Abdomen is soft.      Tenderness: There is no abdominal tenderness.   Musculoskeletal:      Right lower leg: No edema.      Left lower leg: No edema.   Skin:     General: Skin is warm and dry.   Neurological:      General: No focal deficit present.      Mental Status: He is oriented to person, place, and time.   Psychiatric:         Mood and Affect: Mood normal.         Behavior: Behavior normal.         Labs:  No visits with results within 7 Day(s) from this visit.   Latest known visit with results is:   Lab Visit on 03/21/2024   Component Date Value    PSA, Screen 03/21/2024 1.4     Cholesterol 03/21/2024 144     Triglycerides 03/21/2024 78     HDL 03/21/2024 48     LDL Cholesterol 03/21/2024 80.4     HDL/Cholesterol Ratio 03/21/2024 33.3     Total Cholesterol/HDL Ra* 03/21/2024 3.0     Non-HDL Cholesterol 03/21/2024 96     Hepatitis C Ab 03/21/2024 Non-reactive     Sodium 03/21/2024 140     Potassium 03/21/2024 4.3     Chloride 03/21/2024 107     CO2 03/21/2024 23     Glucose 03/21/2024 96     BUN 03/21/2024 13     Creatinine 03/21/2024 1.1     Calcium 03/21/2024 9.4     Total Protein 03/21/2024 7.2     Albumin 03/21/2024 4.1     Total Bilirubin 03/21/2024 0.8     Alkaline Phosphatase 03/21/2024 81     AST 03/21/2024 17     ALT 03/21/2024 21     eGFR 03/21/2024 >60.0     Anion Gap 03/21/2024 10     WBC 03/21/2024 7.95     RBC 03/21/2024 5.27     Hemoglobin 03/21/2024 16.3     Hematocrit 03/21/2024 47.9     MCV 03/21/2024 91     MCH 03/21/2024 30.9     MCHC 03/21/2024 34.0     RDW 03/21/2024 13.3      Platelets 03/21/2024 309     MPV 03/21/2024 10.6     Immature Granulocytes 03/21/2024 0.1     Gran # (ANC) 03/21/2024 4.5     Immature Grans (Abs) 03/21/2024 0.01     Lymph # 03/21/2024 2.6     Mono # 03/21/2024 0.6     Eos # 03/21/2024 0.1     Baso # 03/21/2024 0.10     nRBC 03/21/2024 0     Gran % 03/21/2024 56.3     Lymph % 03/21/2024 33.1     Mono % 03/21/2024 7.8     Eosinophil % 03/21/2024 1.4     Basophil % 03/21/2024 1.3     Differential Method 03/21/2024 Automated     HIV 1/2 Ag/Ab 03/21/2024 Non-reactive            3/8/2024     1:38 PM   Pulmonary Function Tests   FVC 3.19 liters   FEV1 1.35 liters   TLC (liters) 7.22 liters   DLCO (ml/mmHg sec) 12.45 ml/mmHg sec   FVC% 71.7   FEV1% 37.8   FEF 25-75 0.5   FEF 25-75% 14   TLC% 108   DLCO% 53.5         4/22/2024     1:26 PM   6MW   6MWT Status completed without stopping   Patient Reported Dyspnea   Was O2 used? No   6MW Distance walked (feet) 1400 feet   Distance walked (meters) 426.72 meters   Did patient stop? No   Oxygen Saturation 98 %   Supplemental Oxygen Room Air   Heart Rate 74 bpm   Blood Pressure 158/88   Selena Dyspnea Rating  very, very light (just noticeable)   Oxygen Saturation 93 %   Supplemental Oxygen Room Air   Heart Rate 118 bpm   Blood Pressure 195/95   Selena Dyspnea Rating  somewhat heavy   Recovery Time (seconds) 103 seconds   Oxygen Saturation 97 %   Supplemental Oxygen Room Air   Heart Rate 83 bpm       Imaging:  No results found for this or any previous visit.    No results found for this or any previous visit.    No valid procedures specified.  No results found for this or any previous visit.    No results found for this or any previous visit.    No results found for this or any previous visit.    No results found for this or any previous visit.    No results found for this or any previous visit.      Cardiodiagnostics:  No results found for this or any previous visit.    No results found for this or any previous  visit.      Assessment:  1. Chronic obstructive pulmonary disease, unspecified COPD type    2. Lung transplant candidate      Plan:     Continue current COPD management for COPD. No hypoxemia/chronic hypercapnia.     Patient remains early for transplant consideration. Latest FEV1 37.8% predicted. No exertional hypoxemia/chronic hypercapnia. Has never had severe exacerbation due to his underlying COPD. Would need updated TTE as his last was in 2021 with no evidence of underlying PH. His FIONA score today is 3, which predicts a 67% 4 year survival without lung transplant. This is better than the ~50% 5 year survival with lung transplant. He has good functional status and was able to walk 1400 feet while on room air. Should he meet indications in the future, his history of CAD could become a potential barrier.     No RTC planned, prn basis only.       Mckenzie Murillo PA-C  Lung Transplant   You can access the [facility] Portal, offered by [facility], by registering with the following website: [email or web address]

## 2024-04-24 ENCOUNTER — LAB VISIT (OUTPATIENT)
Dept: LAB | Facility: HOSPITAL | Age: 59
End: 2024-04-24
Attending: INTERNAL MEDICINE
Payer: MEDICARE

## 2024-04-24 ENCOUNTER — OFFICE VISIT (OUTPATIENT)
Dept: CARDIOLOGY | Facility: CLINIC | Age: 59
End: 2024-04-24
Payer: MEDICARE

## 2024-04-24 VITALS
DIASTOLIC BLOOD PRESSURE: 85 MMHG | WEIGHT: 174 LBS | OXYGEN SATURATION: 94 % | SYSTOLIC BLOOD PRESSURE: 150 MMHG | HEART RATE: 68 BPM | BODY MASS INDEX: 25.7 KG/M2

## 2024-04-24 DIAGNOSIS — R06.09 DOE (DYSPNEA ON EXERTION): ICD-10-CM

## 2024-04-24 DIAGNOSIS — Z87.891 HISTORY OF SMOKING 30 OR MORE PACK YEARS: ICD-10-CM

## 2024-04-24 DIAGNOSIS — I10 PRIMARY HYPERTENSION: ICD-10-CM

## 2024-04-24 DIAGNOSIS — Z01.810 PREOP CARDIOVASCULAR EXAM: Primary | ICD-10-CM

## 2024-04-24 DIAGNOSIS — Z78.9 MEDICATION INTOLERANCE: ICD-10-CM

## 2024-04-24 DIAGNOSIS — I25.2 HISTORY OF NON-ST ELEVATION MYOCARDIAL INFARCTION (NSTEMI): ICD-10-CM

## 2024-04-24 DIAGNOSIS — J43.2 CENTRILOBULAR EMPHYSEMA: ICD-10-CM

## 2024-04-24 DIAGNOSIS — E78.2 MIXED HYPERLIPIDEMIA: ICD-10-CM

## 2024-04-24 LAB
ALBUMIN/CREAT UR: NORMAL UG/MG (ref 0–30)
CREAT UR-MCNC: 50 MG/DL (ref 23–375)
MICROALBUMIN UR DL<=1MG/L-MCNC: <5 UG/ML

## 2024-04-24 PROCEDURE — 93010 ELECTROCARDIOGRAM REPORT: CPT | Mod: S$PBB,,, | Performed by: INTERNAL MEDICINE

## 2024-04-24 PROCEDURE — 82043 UR ALBUMIN QUANTITATIVE: CPT | Performed by: INTERNAL MEDICINE

## 2024-04-24 PROCEDURE — 99205 OFFICE O/P NEW HI 60 MIN: CPT | Mod: S$PBB,,, | Performed by: INTERNAL MEDICINE

## 2024-04-24 PROCEDURE — 93005 ELECTROCARDIOGRAM TRACING: CPT | Mod: PBBFAC | Performed by: INTERNAL MEDICINE

## 2024-04-24 PROCEDURE — 99999 PR PBB SHADOW E&M-EST. PATIENT-LVL IV: CPT | Mod: PBBFAC,TXP,, | Performed by: INTERNAL MEDICINE

## 2024-04-24 PROCEDURE — 99214 OFFICE O/P EST MOD 30 MIN: CPT | Mod: PBBFAC,TXP | Performed by: INTERNAL MEDICINE

## 2024-04-24 RX ORDER — ROSUVASTATIN CALCIUM 5 MG/1
5 TABLET, COATED ORAL DAILY
Qty: 90 TABLET | Refills: 3 | Status: SHIPPED | OUTPATIENT
Start: 2024-04-24 | End: 2025-04-24

## 2024-04-24 NOTE — PATIENT INSTRUCTIONS
Recommended Mediterranean dietEating Heart-Healthy Food: Using the DASH Plan  Eating for your heart doesnt have to be hard or boring. You just need to know how to make healthier choices. The DASH eating plan has been developed to help you do just that. DASH stands for Dietary Approaches to Stop Hypertension. It is a plan that has been proven to be healthier for your heart and to lower your risk for high blood pressure. It can also help lower your risk for cancer, heart disease, osteoporosis, and diabetes.  Choosing from Each Food Group  Choose foods from each of the food groups below each day. Try to get the recommended number of servings for each food group. The serving numbers are based on a diet of 2,000 calories a day. Talk to your doctor if youre unsure about your calorie needs.  Grains   Servings: 7-8 a day  A serving is:  1 slice bread  1 ounce dry cereal  half a cup cooked rice or pasta  Best choices: Whole grains and any grains high in fiber.  Vegetables   Servings: 4-5 a day  A serving is:  1 cup raw leafy vegetable  Half a cup cooked vegetable  Three-quarter cup vegetable juice  Best choices: Fresh or frozen vegetable prepared without too much added salt or fat.    Fruits   Servings: 4-5 a day  A serving is:  Three-quarter cup fruit juice  1 medium fruit  One-quarter cup dried fruit  One-half cup fresh, frozen, or canned fruit  Best choices: A variety of fresh fruits of different colors. Whole fruits are a much better choice than fruit juices.  Low-fat or Fat Free Dairy   Servings: 2-3 a day  A serving is:  8 ounces milk  1 cup yogurt  One and a half ounces cheese  Best choices: Skim or 1% milk, low-fat or fat free yogurt or buttermilk, and low-fat cheeses.       Meat, Poultry, Fish   Servings: 2 or fewer a day  A serving is:  3 ounces cooked meat, poultry, or fish  Best choices: Lean meats and fish. Trim away visible fat. Broil, roast, or boil instead of frying. Remove skin from poultry before eating.   Nuts, Seeds, Beans   Servings: 4-5 a week  A serving is:  One third cup nuts (or one and a half ounces)  2 tablespoons sunflower seeds  Half a cup cooked beans  Best choices: Dry roasted nuts with no salt added, lentils, kidney beans, garbanzo beans, and whole tran beans.    Fats and Oils   Servings: 2 a day  A serving is:  1 teaspoon vegetable oil  1 teaspoon soft margarine  1 tablespoon low-fat mayonnaise  1 teaspoon regular mayonnaise  2 tablespoons light salad dressing  1 tablespoon regular salad dressing  Best choices: Monounsaturated and polyunsaturated fats such as olive, canola, or safflower oil.  Sweets   Servings: 5 a week or fewer  A serving is:  1 tablespoon sugar, maple syrup, or honey  1 tablespoon jam or jelly  1 half-ounce jelly beans (about 15)  8 ounces lemonade  Best choices: Dried fruit can be a satisfying sweet. Choose low-fat sweets when possible. And watch your serving sizes!    Aerobic Exercise for a Healthy Heart  Exercise is a lot more than an energy booster and a stress reliever. It also strengthens your heart muscle, lowers your blood pressure and blood cholesterol, and burns calories.      Remember, some activity is better than none.     Choose an Aerobic Activity  Choose a nonstop activity that makes your heart and lungs work harder than they do when you rest or walk normally. This aerobic exercise can improve the way your heart and other muscles use oxygen. Make it fun by exercising with a friend and choosing an activity you enjoy. Here are some ideas:  Walking  Swimming  Bicycling  Stair climbing  Dancing  Jogging  Exercise Regularly  If you havent been exercising regularly,  get your doctors okay first. Then start slowly.  Here are some tips:  Begin exercising 3 times a week for 5-10 minutes at a time.  When you feel comfortable, add a few minutes each week.  Slowly build up to exercising 3-4 times each week for 20-40 minutes. Aim for a total of 150 or more minutes a week.  Be  sure to carry your nitroglycerin with you when you exercise.  If you get angina when youre exercising, stop what youre doing, take your nitroglycerin, and call your doctor.  © 8230-1939 [employer], [address] [place] [address], [place] [address]. All rights reserved. This information is not intended as a substitute for professional medical care. Always follow your healthcare professional's instructions.

## 2024-04-24 NOTE — PROGRESS NOTES
"Subjective:    Patient ID:  Vimal Cruz is a 58 y.o. male who presents for evaluation of No chief complaint on file.  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     Health literacy: high   Vaccinations: up-to-date, completed COVID, no infection   Activities: walk dog at least a mile a day, bike daily 5 miles, no other exercise. Feel limited by umbilical hernia  Nicotine: started age 13, 36 years a ppd, quit 3/2020  Alcohol: max 4 beers in any 24 hours, once every other month.  Illicit drugs: none  Cardiac symptoms: none  Home BP: 117 to 125 /78 to 80  Medication compliance: yes, do not miss  Diet: regular, use salt  Caffeine: 1-2 cpd  Labs: No results found for: "TSH"   No results found for: "LABA1C", "HGBA1C"    Lab Results   Component Value Date    WBC 7.95 03/21/2024    HGB 16.3 03/21/2024    HCT 47.9 03/21/2024    MCV 91 03/21/2024     03/21/2024       CMP  Sodium   Date Value Ref Range Status   03/21/2024 140 136 - 145 mmol/L Final     Potassium   Date Value Ref Range Status   03/21/2024 4.3 3.5 - 5.1 mmol/L Final     Chloride   Date Value Ref Range Status   03/21/2024 107 95 - 110 mmol/L Final     CO2   Date Value Ref Range Status   03/21/2024 23 23 - 29 mmol/L Final     Glucose   Date Value Ref Range Status   03/21/2024 96 70 - 110 mg/dL Final     BUN   Date Value Ref Range Status   03/21/2024 13 6 - 20 mg/dL Final     Creatinine   Date Value Ref Range Status   03/21/2024 1.1 0.5 - 1.4 mg/dL Final     Calcium   Date Value Ref Range Status   03/21/2024 9.4 8.7 - 10.5 mg/dL Final     Total Protein   Date Value Ref Range Status   03/21/2024 7.2 6.0 - 8.4 g/dL Final     Albumin   Date Value Ref Range Status   03/21/2024 4.1 3.5 - 5.2 g/dL " "Final     Total Bilirubin   Date Value Ref Range Status   03/21/2024 0.8 0.1 - 1.0 mg/dL Final     Comment:     For infants and newborns, interpretation of results should be based  on gestational age, weight and in agreement with clinical  observations.    Premature Infant recommended reference ranges:  Up to 24 hours.............<8.0 mg/dL  Up to 48 hours............<12.0 mg/dL  3-5 days..................<15.0 mg/dL  6-29 days.................<15.0 mg/dL       Alkaline Phosphatase   Date Value Ref Range Status   03/21/2024 81 55 - 135 U/L Final     AST   Date Value Ref Range Status   03/21/2024 17 10 - 40 U/L Final     ALT   Date Value Ref Range Status   03/21/2024 21 10 - 44 U/L Final     Anion Gap   Date Value Ref Range Status   03/21/2024 10 8 - 16 mmol/L Final     eGFR   Date Value Ref Range Status   03/21/2024 >60.0 >60 mL/min/1.73 m^2 Final     @labrcntip(troponini)@  No results found for: "BNP"}   Lab Results   Component Value Date    CHOL 144 03/21/2024     Lab Results   Component Value Date    HDL 48 03/21/2024     Lab Results   Component Value Date    LDLCALC 80.4 03/21/2024     Lab Results   Component Value Date    TRIG 78 03/21/2024     Lab Results   Component Value Date    CHOLHDL 33.3 03/21/2024       Last Echo: 10/2021, [place] study Echo 6/2023, reported normal.  Last stress test: none  Cardiovascular angiogram: 5/2017, primary, 40%, no intervention  ECG: NSR, rate 63, normal  Fundoscopic exam: within the past year, negative for retinopathy    WM referred for pre-op clearance for hernia operation. History of MI with NOCA and treated medically. Problem with high dose atorvastatin due to muscle and LDL-C of > 80. Denies any CV symptoms. Quit smoking in 2020.     Roel Bonilla MD noted 3/26/2024 "58-year-old male with significant past coronary and pulmonary disease who presents with a reducible umbilical hernia causing mild discomfort.  Discussed risks and benefits of surgery as well as inherent risks " secondary to his underlying cardiac and pulmonary issues.  Recommended referral to both Cardiology and Pulmonary for perioperative risk assessment.     Old record reviewed: Echo 10/2021 - LVEF calculated as 73% using 2D biplane. LVEF is visually ~ 65 - 70%. LV  systolic function is normal. LV size is normal by linear dimension. LV  volume is normal. Normal LV wall thickness. LV wall motion normal. Normal  LV diastolic function.  · RV normal size. RV systolic function is normal.  · LA normal size.  · Tricuspid aortic valve with mildly thickened cusps. Aortic valve opens  well. No aortic regurgitation. No aortic valve stenosis.  · Normal right atrial pressure, estimated as 3 mmHg. Unable to assess RV  systolic pressure due to insufficient TR signal.  · Aortic root normal. Ascending aorta normal.  · No pericardial effusion.      Review of Systems   Constitutional: Negative for chills, decreased appetite, diaphoresis, fever, malaise/fatigue, night sweats, weight gain and weight loss.   HENT:  Positive for congestion and hearing loss. Negative for hoarse voice, nosebleeds, sore throat and tinnitus.    Eyes:  Negative for double vision, pain and visual disturbance.   Cardiovascular:  Positive for dyspnea on exertion. Negative for chest pain, claudication, cyanosis, irregular heartbeat, leg swelling, near-syncope, orthopnea, palpitations and paroxysmal nocturnal dyspnea.   Respiratory:  Positive for shortness of breath. Negative for cough, sleep disturbances due to breathing, snoring, sputum production and wheezing.         B[place]ank score 4, awaken refreshed.   Endocrine: Negative for cold intolerance, heat intolerance, polydipsia and polyuria.   Hematologic/Lymphatic: Negative for bleeding problem. Bruises/bleeds easily.   Skin:  Positive for itching. Negative for color change, flushing, nail changes, poor wound healing and suspicious lesions.   Musculoskeletal:  Negative for arthritis, falls, gout, joint pain, joint  swelling, muscle cramps, muscle weakness, myalgias and neck pain.   Gastrointestinal:  Negative for change in bowel habit, constipation, diarrhea, dysphagia, heartburn, hematemesis, hematochezia, jaundice, melena and nausea.   Genitourinary:  Negative for bladder incontinence, frequency, hematuria, hesitancy and urgency.   Neurological:  Positive for numbness. Negative for disturbances in coordination, excessive daytime sleepiness, dizziness, focal weakness, headaches, light-headedness, loss of balance, paresthesias, seizures, vertigo and weakness.   Psychiatric/Behavioral:  Negative for depression, memory loss and substance abuse. The patient is nervous/anxious. The patient does not have insomnia.      Answers submitted by the patient for this visit:  Review of Symptoms (Submitted on 4/23/2024)  Sweats?: No  chest tightness: No  Difficulty breathing when lying down?: No  syncope: No     Objective:    Physical Exam  Constitutional:       Appearance: He is well-developed.      Comments: RA Os sat 94%  Orthostatic VS: sitting 154/93, standing 150/85   HENT:      Head: Normocephalic.   Eyes:      Conjunctiva/sclera: Conjunctivae normal.      Pupils: Pupils are equal, round, and reactive to light.   Neck:      Thyroid: No thyromegaly.      Vascular: No JVD.   Cardiovascular:      Rate and Rhythm: Normal rate and regular rhythm.      Pulses: Intact distal pulses.           Carotid pulses are 2+ on the right side and 2+ on the left side.       Radial pulses are 2+ on the right side and 2+ on the left side.        Dorsalis pedis pulses are 2+ on the right side and 2+ on the left side.        Posterior tibial pulses are 2+ on the right side and 2+ on the left side.      Heart sounds: Heart sounds are distant. No murmur heard.     No friction rub. No gallop.   Pulmonary:      Effort: Pulmonary effort is normal.      Breath sounds: No rales.      Comments: Diminished breath sounds and prolong expiration.  Chest:      Chest  "wall: No tenderness.   Abdominal:      General: Bowel sounds are normal.      Palpations: Abdomen is soft.      Tenderness: There is no abdominal tenderness.      Comments: Waist 39.5"   Musculoskeletal:         General: Normal range of motion.      Cervical back: Normal range of motion and neck supple.   Lymphadenopathy:      Cervical: No cervical adenopathy.   Skin:     General: Skin is warm and dry.      Findings: No rash.   Neurological:      Mental Status: He is alert and oriented to person, place, and time.           Assessment:       1. Preop cardiovascular exam    2. History of smoking 30 or more pack years, quit 2020, 38 py    3. Primary hypertension, dx 2017    4. Centrilobular emphysema    5. Mixed hyperlipidemia    6. Medication intolerance, atorvastatin 20 mg with muscle    7. History of non-ST elevation myocardial infarction (NSTEMI), MINOCA    8. DUMONT (dyspnea on exertion), onset 2020         Plan:       Diagnoses and all orders for this visit:    Preop cardiovascular exam  -     IN OFFICE EKG 12-LEAD (to Muse)  -     Stress Echo Which stress agent will be used? Treadmill Exercise; Color Flow Doppler? Yes; Future  -     rosuvastatin (CRESTOR) 5 MG tablet; Take 1 tablet (5 mg total) by mouth once daily.    History of smoking 30 or more pack years, quit 2020, 38 py    Primary hypertension, dx 2017  -     Microalbumin/Creatinine Ratio, Urine; Future    Centrilobular emphysema  -     Stress Echo Which stress agent will be used? Treadmill Exercise; Color Flow Doppler? Yes; Future    Mixed hyperlipidemia  -     rosuvastatin (CRESTOR) 5 MG tablet; Take 1 tablet (5 mg total) by mouth once daily.  -     Lipid Panel; Future    Medication intolerance, atorvastatin 20 mg with muscle    History of non-ST elevation myocardial infarction (NSTEMI), MINOCA  -     Stress Echo Which stress agent will be used? Treadmill Exercise; Color Flow Doppler? Yes; Future  -     rosuvastatin (CRESTOR) 5 MG tablet; Take 1 tablet (5 " mg total) by mouth once daily.  -     Lipid Panel; Future  -     CRP, High Sensitivity; Future    DUMONT (dyspnea on exertion), onset 2020     - All medical issues reviewed, willing to switch statin, target LDL-C is < 70  - Warning signs of MI and stroke given, if symptoms last more than 5 minutes, stop immediately and call 911, then chew 2-4 low-dose ASA (81 mg).   - CV status and all medications reviewed, patient acknowledge good understanding.  - Recommend healthy living: moderate alcohol, healthy diet and regular exercise aiming for fitness, restorative sleep and weight control  - Discussed healthy daily limit of 1 oz of pure alcohol in any 24 hours (roughly 2 12-oz beers, 10 oz of wine (8%-12% alcohol), or 1.5 oz of liquor (80 proof)), can not save up.   - Need good exercise program, 4 key elements: 1. Aerobic (walking, swimming, dancing, jogging, biking, etc, 2. Muscle strengthening / resistance exercise, need to do 2-3 times weekly, 3. Stretching daily, good stretch takes a whole  total minute. 4. Balance exercise daily.   - Instruction for Mediterranean diet and heart healthy exercise given.  - Check home blood pressure, 2 days weekly, do 2 readings within 5 minutes in AM and PM, keep log for review. Target resting BP is less than 130/80.   - Highly recommend 30-60 minutes of exercise / activities daily, can have Sunday off, with 2-3 sessions of muscle strengthening weekly. A  would be very helpful.  - Recommend at least annual cardiovascular evaluation in view of patient's significant risk factors.  - Phone review / encourage use of [facility]       Total time spend including review of record prior to face-to-face visit is 60 minutes. Greater than 50% of the time was spent in counseling and coordination of care. The above assessment and plan have been discussed at length. Referring provider's note reviewed. Labs and procedure over the last 6 months reviewed. Problem List updated. Asked to bring  in all active medications / pills bottles with next visit. Will send note to referring / PCP.

## 2024-04-29 ENCOUNTER — OFFICE VISIT (OUTPATIENT)
Dept: FAMILY MEDICINE | Facility: CLINIC | Age: 59
End: 2024-04-29
Payer: MEDICARE

## 2024-04-29 VITALS
SYSTOLIC BLOOD PRESSURE: 132 MMHG | OXYGEN SATURATION: 96 % | DIASTOLIC BLOOD PRESSURE: 72 MMHG | BODY MASS INDEX: 25.61 KG/M2 | HEIGHT: 69 IN | HEART RATE: 78 BPM | WEIGHT: 172.88 LBS

## 2024-04-29 DIAGNOSIS — H92.02 ACUTE OTALGIA, LEFT: Primary | ICD-10-CM

## 2024-04-29 DIAGNOSIS — E78.2 MIXED HYPERLIPIDEMIA: ICD-10-CM

## 2024-04-29 DIAGNOSIS — I10 ESSENTIAL HYPERTENSION: ICD-10-CM

## 2024-04-29 DIAGNOSIS — J43.2 CENTRILOBULAR EMPHYSEMA: ICD-10-CM

## 2024-04-29 PROBLEM — Z78.9 MEDICATION INTOLERANCE: Status: RESOLVED | Noted: 2024-04-24 | Resolved: 2024-04-29

## 2024-04-29 PROCEDURE — 99214 OFFICE O/P EST MOD 30 MIN: CPT | Mod: S$GLB,,, | Performed by: STUDENT IN AN ORGANIZED HEALTH CARE EDUCATION/TRAINING PROGRAM

## 2024-04-29 RX ORDER — AMOXICILLIN 500 MG/1
500 TABLET, FILM COATED ORAL EVERY 12 HOURS
Qty: 20 TABLET | Refills: 0 | Status: SHIPPED | OUTPATIENT
Start: 2024-04-29 | End: 2024-05-09

## 2024-04-29 RX ORDER — FLUTICASONE PROPIONATE 50 MCG
1 SPRAY, SUSPENSION (ML) NASAL DAILY
Qty: 16 G | Refills: 11 | Status: SHIPPED | OUTPATIENT
Start: 2024-04-29

## 2024-04-29 RX ORDER — CIPROFLOXACIN AND DEXAMETHASONE 3; 1 MG/ML; MG/ML
4 SUSPENSION/ DROPS AURICULAR (OTIC) 2 TIMES DAILY
Qty: 7.5 ML | Refills: 0 | Status: SHIPPED | OUTPATIENT
Start: 2024-04-29

## 2024-04-29 NOTE — PROGRESS NOTES
"    [facility]    [facility]  [address], [address]  [place] [address]    Subjective     Patient ID: Vimal Cruz is a 58 y.o. male who comes to the clinic for an acute visit.    Chief Complaint: Otalgia (Patient states he has left ear pain for over a week )    Patient is having left ear pain, feels like it's clogged. Started a few days ago. No fever or SOB.     HTN - on ramipril and diltiazem     DLD - on atorvastatin     COPD - on breo-ellipta 1 puff daily, has rescue inhaler, sees pulmonology w/ Dr. Pathak w/ [facility] - takes buspar 15mg     History of NSTEMI - happened years ago in 2017, on statin but not baby aspirin     CAD - on statin    ROS negative unless stated above       Objective     Vitals:    04/29/24 1104   BP: 132/72   Pulse: 78   SpO2: 96%   Weight: 78.4 kg (172 lb 14.4 oz)   Height: 5' 9" (1.753 m)       Wt Readings from Last 3 Encounters:   04/29/24 1104 78.4 kg (172 lb 14.4 oz)   04/24/24 0948 78.9 kg (174 lb)   04/22/24 1326 78 kg (172 lb)        Physical Exam  Vitals reviewed.   Constitutional:       Appearance: Normal appearance.   HENT:      Head: Normocephalic and atraumatic.      Ears:      Comments: Left ear w/ some white exudate, red TM  Eyes:      Extraocular Movements: Extraocular movements intact.      Pupils: Pupils are equal, round, and reactive to light.   Cardiovascular:      Rate and Rhythm: Normal rate.      Pulses: Normal pulses.      Heart sounds: Normal heart sounds.   Pulmonary:      Effort: Pulmonary effort is normal. No respiratory distress.      Breath sounds: Normal breath sounds.   Musculoskeletal:         General: Normal range of motion.      Cervical back: Normal range of motion and neck supple.   Skin:     General: Skin is dry.      Capillary Refill: Capillary refill takes less than 2 seconds.   Neurological:      General: No focal deficit present.      Mental Status: He is alert and oriented to " person, place, and time. Mental status is at baseline.   Psychiatric:         Mood and Affect: Mood normal.         Behavior: Behavior normal.         Thought Content: Thought content normal.         Current Outpatient Medications   Medication Instructions    amoxicillin (AMOXIL) 500 mg, Oral, Every 12 hours    azelastine (ASTELIN) 137 mcg (0.1 %) nasal spray 2 sprays, Nasal, Nightly PRN    BREO ELLIPTA 100-25 mcg/dose diskus inhaler 1 puff, Inhalation, Daily    busPIRone (BUSPAR) 7.5 mg, Oral, 3 times daily PRN    cetirizine 10 mg, Oral, Nightly    ciprofloxacin-dexAMETHasone 0.3-0.1% (CIPRODEX) 0.3-0.1 % DrpS 4 drops, Both Ears, 2 times daily    diltiaZEM (CARDIZEM CD) 120 mg, Oral, Nightly    fluticasone propionate (FLONASE) 50 mcg, Each Nostril, Daily    ramipriL (ALTACE) 2.5 mg, Daily    rosuvastatin (CRESTOR) 5 mg, Oral, Daily    WIXELA INHUB 250-50 mcg/dose diskus inhaler 1 puff, Inhalation, 2 times daily, Is not taking - makes him hoarse after a week of use.            Assessment and Plan     1. Acute otalgia, left  -     ciprofloxacin-dexAMETHasone 0.3-0.1% (CIPRODEX) 0.3-0.1 % DrpS; Place 4 drops into both ears 2 (two) times daily.  Dispense: 7.5 mL; Refill: 0  -     amoxicillin (AMOXIL) 500 MG Tab; Take 1 tablet (500 mg total) by mouth every 12 (twelve) hours. for 10 days  Dispense: 20 tablet; Refill: 0  -     fluticasone propionate (FLONASE) 50 mcg/actuation nasal spray; 1 spray (50 mcg total) by Each Nostril route once daily.  Dispense: 16 g; Refill: 11    2. Mixed hyperlipidemia    3. Centrilobular emphysema    4. Essential hypertension        Here for an acute visit    For possible AOM, treating w/ amoxil (not a true allergy because food negates any SE's/symptoms) and ciprodex    Continue statin for DLD    For HTN, at goal, continue current regimen    RTC in September          I encouraged the patient to take all medications as prescribed and to keep follow up appointments with their providers.  Patient stated they had no other concerns. Questions were invited and answered. Follow up sooner if need. ED precautions given.    Myron Duarte MD  04/29/2024 11:03 AM

## 2024-04-30 ENCOUNTER — OFFICE VISIT (OUTPATIENT)
Dept: PULMONOLOGY | Facility: CLINIC | Age: 59
End: 2024-04-30
Payer: MEDICARE

## 2024-04-30 VITALS
OXYGEN SATURATION: 98 % | HEART RATE: 80 BPM | WEIGHT: 176.13 LBS | DIASTOLIC BLOOD PRESSURE: 88 MMHG | SYSTOLIC BLOOD PRESSURE: 147 MMHG | BODY MASS INDEX: 26.09 KG/M2 | HEIGHT: 69 IN

## 2024-04-30 DIAGNOSIS — R06.09 DYSPNEA ON EXERTION: Primary | ICD-10-CM

## 2024-04-30 DIAGNOSIS — J43.2 CENTRILOBULAR EMPHYSEMA: ICD-10-CM

## 2024-04-30 DIAGNOSIS — F17.211 NICOTINE DEPENDENCE, CIGARETTES, IN REMISSION: ICD-10-CM

## 2024-04-30 LAB
OHS QRS DURATION: 78 MS
OHS QTC CALCULATION: 386 MS

## 2024-04-30 PROCEDURE — 99999 PR PBB SHADOW E&M-EST. PATIENT-LVL III: CPT | Mod: PBBFAC,,, | Performed by: INTERNAL MEDICINE

## 2024-04-30 PROCEDURE — 99213 OFFICE O/P EST LOW 20 MIN: CPT | Mod: PBBFAC,PO | Performed by: INTERNAL MEDICINE

## 2024-04-30 PROCEDURE — 99204 OFFICE O/P NEW MOD 45 MIN: CPT | Mod: S$PBB,,, | Performed by: INTERNAL MEDICINE

## 2024-04-30 RX ORDER — ALBUTEROL SULFATE 90 UG/1
2 AEROSOL, METERED RESPIRATORY (INHALATION) EVERY 6 HOURS PRN
Qty: 18 G | Refills: 11 | Status: SHIPPED | OUTPATIENT
Start: 2024-04-30

## 2024-04-30 RX ORDER — FLUTICASONE FUROATE, UMECLIDINIUM BROMIDE AND VILANTEROL TRIFENATATE 200; 62.5; 25 UG/1; UG/1; UG/1
1 POWDER RESPIRATORY (INHALATION) DAILY
Qty: 60 EACH | Refills: 11 | Status: SHIPPED | OUTPATIENT
Start: 2024-04-30

## 2024-04-30 NOTE — PROGRESS NOTES
"Pulmonary Clinic New Patient    HISTORY OF PRESENT ILLNESS   Vimal Cruz is a 58 y.o. male with a PMH of NSTEMI, emphysema, COPD, and depression on whom we have been consulted for establishment of pulmonary care.    He complains of dyspnea on exertion, but he thinks the long-acting inhalers don't help him. He walks his dog a mile a day and bicycles 2-3 miles per day. He reports he had a mesenchymal stem cell infusion at [facility] as part of a phase 1 study for COPD treatment.    He has been prescribed steroids twice in the past year for COPD exacerbations.      SMOKING HISTORY  Quit 2020.  1PPD x 35 years.    REVIEW OF SYSTEMS  As in HPI.    PFSH:  Past Medical History:   Diagnosis Date    Anxiety     Arthritis     "degenerative bone disease"    COPD (chronic obstructive pulmonary disease)     Emphysema of lung     Hypertension     Medication intolerance, atorvastatin 20 mg with muscle 04/24/2024         Past Surgical History:   Procedure Laterality Date    ANTERIOR SPINAL FUSION  2016    c5    HERNIA REPAIR       Social History     Tobacco Use    Smoking status: Former     Average packs/day: 1 pack/day for 41.3 years (41.3 ttl pk-yrs)     Types: Cigarettes     Start date: 11/21/1978    Smokeless tobacco: Never   Substance Use Topics    Alcohol use: Not Currently     Comment: socially    Drug use: Never     Family History   Problem Relation Name Age of Onset    Dementia Mother      Stroke Mother  77        after stopping zyprexa    Emphysema Maternal Grandmother  56    Throat cancer Maternal Grandfather      Cancer Maternal Grandfather  87        colon    Dementia Paternal Grandmother      Stroke Paternal Grandfather       Review of patient's allergies indicates:   Allergen Reactions    Amoxicillin Itching     Only if not taken with food    Penicillin Itching     Only if taken without food         VITAL SIGNS (MOST RECENT)  Pulse: 80 (04/30/24 1401)  BP: (!) 147/88 (04/30/24 1401)  SpO2: 98 % (04/30/24 " 1401)    PHYSICAL EXAM  GENERAL: NAD.  HEENT: Pupils equal and reactive. Extraocular movements intact.   NECK: Supple.   HEART: Regular rate and rhythm. No murmur or gallop auscultated.  LUNGS: Clear to auscultation and percussion. Lung excursion symmetrical.  ABDOMEN: Bowel sounds present. Non-tender, no masses palpated.  EXTREMITIES: Normal muscle tone and joint movement, no cyanosis or clubbing.   LYMPHATICS: No adenopathy palpated, no edema.  SKIN: Dry, intact, no lesions.   NEURO: No gross cognitive or motor deficits.  PSYCH: Appropriate affect.    Lab Results   Component Value Date    WBC 7.95 03/21/2024    HGB 16.3 03/21/2024    HCT 47.9 03/21/2024     03/21/2024    CHOL 144 03/21/2024    TRIG 78 03/21/2024    HDL 48 03/21/2024    ALT 21 03/21/2024    AST 17 03/21/2024     03/21/2024    K 4.3 03/21/2024     03/21/2024    CREATININE 1.1 03/21/2024    BUN 13 03/21/2024    CO2 23 03/21/2024    PSA 1.4 03/21/2024       LAST ARTERIAL BLOOD GAS  @LABRCNTIP[PH,PO2,PCO2,HCO3,BE@      LAST 7 DAYS MICROBIOLOGY   Microbiology Results (last 7 days)       ** No results found for the last 168 hours. **            MOST RECENT IMAGING  No image results found.      CURRENT VISIT EKG  No results found for this visit on 04/30/24.    OUTSIDE CT CHEST REPORT 10/14/21  Advanced emphysema is present. There is no pneumonitis. A punctate   noncalcified pulmonary nodule is seen in the posterior right lung apex.   There is a small calcified granuloma in the posterior right upper lobe.   Mild linear scar is seen in the left lung base.     There is narrowing of the transverse diameter of the intrathoracic   trachea which may reflect a component of tracheomalacia. Approximately   50% narrowing is present in inspiration.     There is mild to moderate calcification of the proximal to mid LAD.   Heart size is within normal limits.     ECHOCARDIOGRAM RESULTS  No results found for this or any previous visit.      Pulmonary  Function Tests  Per outside records:  PFTs (5/4/20)-FEV1 41% (with significant BD response), DLCO 39%; FeNO 63.  PFTs (9/9/20)-FEV1 44%, DLCO 40%.     ASSESSMENT & PLAN   Vimal Cruz is a 58 y.o. male with a PMH of NSTEMI, emphysema, COPD, and depression on whom we have been consulted for establishment of pulmonary care.    #COPD  #DUMONT  - PFTs  - TTE  - switch Breo to Trelegy to optimize inhaler therapy and rescue inhaler    #Smoking history  - patient to bring in CT chest imaging done at [facility] last month   - repeat imaging March 2025 if no significant nodules    Pre-Operative Risk Assessment for umbilical hernia repair  ARISCAT Score for Postoperative Pulmonary Complications     RESULT SUMMARY:  3 points  ARISCAT Score    Low risk  1.6% risk of in-hospital post-op pulmonary complications (composite including respiratory failure, respiratory infection, pleural effusion, atelectasis, pneumothorax, bronchospasm, aspiration pneumonitis)      INPUTS:  Age, years --> 3 = 51-80  Preoperative SpO? --> 0 = ?96%  Respiratory infection in the last month --> 0 = No  Preoperative anemia (Hgb ?10 g/dL) --> 0 = No  Surgical incision --> 0 = Peripheral  Duration of surgery --> 0 = <2 hrs  Emergency procedure --> 0 = No      Follow up in 6 months.    I have personally reviewed recent labs and ABGs, and I have viewed and interpreted the images of recent chest imaging, as above.     Gasper Hernandez MD  Pulmonary and Critical Care Medicine  [facility]  Date of Service: 04/30/2024  2:05 PM

## 2024-05-09 ENCOUNTER — HOSPITAL ENCOUNTER (OUTPATIENT)
Dept: PULMONOLOGY | Facility: HOSPITAL | Age: 59
Discharge: HOME | End: 2024-05-09
Attending: INTERNAL MEDICINE
Payer: MEDICARE

## 2024-05-09 DIAGNOSIS — J43.2 CENTRILOBULAR EMPHYSEMA: ICD-10-CM

## 2024-05-09 LAB
DLCO SINGLE BREATH LLN: 21.38
DLCO SINGLE BREATH PRE REF: 37.8 %
DLCO SINGLE BREATH REF: 28.31
DLCOC SBVA LLN: 2.88
DLCOC SBVA REF: 4.09
DLCOC SINGLE BREATH LLN: 21.38
DLCOC SINGLE BREATH REF: 28.31
DLCOVA LLN: 2.88
DLCOVA PRE REF: 43.3 %
DLCOVA PRE: 1.77 ML/(MIN*MMHG*L) (ref 2.88–5.3)
DLCOVA REF: 4.09
ERV LLN: -16448.83
ERV PRE REF: 127.2 %
ERV REF: 1.17
FEF 25 75 CHG: 14.1 %
FEF 25 75 LLN: 1.41
FEF 25 75 POST REF: 18.6 %
FEF 25 75 PRE REF: 16.3 %
FEF 25 75 REF: 2.89
FET100 CHG: 6.4 %
FEV1 CHG: 10.6 %
FEV1 FVC CHG: 5.3 %
FEV1 FVC LLN: 66
FEV1 FVC POST REF: 51.1 %
FEV1 FVC PRE REF: 48.5 %
FEV1 FVC REF: 78
FEV1 LLN: 2.6
FEV1 POST REF: 46.7 %
FEV1 PRE REF: 42.2 %
FEV1 REF: 3.46
FRCPLETH LLN: 2.56
FRCPLETH PREREF: 183.3 %
FRCPLETH REF: 3.55
FVC CHG: 5 %
FVC LLN: 3.4
FVC POST REF: 91.3 %
FVC PRE REF: 86.9 %
FVC REF: 4.47
IVC PRE: 3.77 L (ref 3.4–5.54)
IVC SINGLE BREATH LLN: 3.4
IVC SINGLE BREATH PRE REF: 84.5 %
IVC SINGLE BREATH REF: 4.47
MVV LLN: 112
MVV PRE REF: 51.3 %
MVV REF: 132
PEF CHG: -2.9 %
PEF LLN: 6.75
PEF POST REF: 56.6 %
PEF PRE REF: 58.3 %
PEF REF: 9
POST FEF 25 75: 0.54 L/S (ref 1.41–4.89)
POST FET 100: 14.91 SEC
POST FEV1 FVC: 39.62 % (ref 65.51–88.11)
POST FEV1: 1.62 L (ref 2.6–4.27)
POST FVC: 4.08 L (ref 3.4–5.54)
POST PEF: 5.09 L/S (ref 6.75–11.25)
PRE DLCO: 10.71 ML/(MIN*MMHG) (ref 21.38–35.24)
PRE ERV: 1.48 L (ref -16448.83–16451.17)
PRE FEF 25 75: 0.47 L/S (ref 1.41–4.89)
PRE FET 100: 14.02 SEC
PRE FEV1 FVC: 37.62 % (ref 65.51–88.11)
PRE FEV1: 1.46 L (ref 2.6–4.27)
PRE FRC PL: 6.51 L (ref 2.56–4.54)
PRE FVC: 3.88 L (ref 3.4–5.54)
PRE MVV: 67.87 L/MIN (ref 112.41–152.08)
PRE PEF: 5.24 L/S (ref 6.75–11.25)
PRE RV: 5.03 L (ref 1.71–3.06)
PRE TLC: 8.95 L (ref 5.77–8.07)
RAW LLN: 3.06
RAW PRE REF: 83 %
RAW PRE: 2.54 CMH2O*S/L (ref 3.06–3.06)
RAW REF: 3.06
RV LLN: 1.71
RV PRE REF: 210.8 %
RV REF: 2.39
RVTLC LLN: 28
RVTLC PRE REF: 150.3 %
RVTLC PRE: 56.17 % (ref 28.38–46.34)
RVTLC REF: 37
TLC LLN: 5.77
TLC PRE REF: 129.3 %
TLC REF: 6.92
VA PRE: 6.05 L (ref 6.77–6.77)
VA SINGLE BREATH LLN: 6.77
VA SINGLE BREATH PRE REF: 89.3 %
VA SINGLE BREATH REF: 6.77
VC LLN: 3.4
VC PRE REF: 87.8 %
VC PRE: 3.92 L (ref 3.4–5.54)
VC REF: 4.47

## 2024-05-09 PROCEDURE — 94060 EVALUATION OF WHEEZING: CPT

## 2024-05-09 PROCEDURE — 94729 DIFFUSING CAPACITY: CPT

## 2024-05-09 PROCEDURE — 94060 EVALUATION OF WHEEZING: CPT | Mod: 26,,, | Performed by: INTERNAL MEDICINE

## 2024-05-09 PROCEDURE — 94726 PLETHYSMOGRAPHY LUNG VOLUMES: CPT

## 2024-05-09 PROCEDURE — 94729 DIFFUSING CAPACITY: CPT | Mod: 26,,, | Performed by: INTERNAL MEDICINE

## 2024-05-09 PROCEDURE — 94726 PLETHYSMOGRAPHY LUNG VOLUMES: CPT | Mod: 26,,, | Performed by: INTERNAL MEDICINE

## 2024-05-09 RX ORDER — ALBUTEROL SULFATE 2.5 MG/.5ML
SOLUTION RESPIRATORY (INHALATION)
Status: DISPENSED
Start: 2024-05-09 | End: 2024-05-09

## 2024-05-13 ENCOUNTER — TELEPHONE (OUTPATIENT)
Dept: PULMONOLOGY | Facility: CLINIC | Age: 59
End: 2024-05-13
Payer: MEDICARE

## 2024-05-13 NOTE — TELEPHONE ENCOUNTER
----- Message from Katinarafy Whatley sent at 5/13/2024  3:25 PM CDT -----  Regarding: sx clearance  Contact: pt  Type:  Needs Medical Advice    Who Called: pt    Would the patient rather a call back or a response via [facility]? Call back    Best Call Back Number: [phone]    Additional Information: pt requesting sx clearance for a hernia sx to be sent to Dr Roel Bonilla in [facility].  Sx is not yet scheduled.    Please advise.  Thank you.

## 2024-05-30 ENCOUNTER — TELEPHONE (OUTPATIENT)
Dept: PULMONOLOGY | Facility: CLINIC | Age: 59
End: 2024-05-30
Payer: MEDICARE

## 2024-05-30 NOTE — TELEPHONE ENCOUNTER
Spoke to patient.  He is wanting to have a umbilical hernia repair in the next couple of months.  He is wanting to see if you will clear him for anesthesia.  Please advise.

## 2024-05-30 NOTE — TELEPHONE ENCOUNTER
----- Message from Starla Thomason sent at 5/30/2024  1:47 PM CDT -----  Contact: self  Type:  Needs Medical Advice    Who Called: self  Symptoms (please be specific): pt needs to know if dr can clear him for anesthesia. Please call pt for more info if needed.    Would the patient rather a call back or a response via [facility]? call  Best Call Back Number: [phone] (home) [phone] (work)    Additional Information: please advise and thank you.

## 2024-06-03 ENCOUNTER — DOCUMENTATION ONLY (OUTPATIENT)
Dept: INFECTIOUS DISEASES | Facility: HOSPITAL | Age: 59
End: 2024-06-03

## 2024-06-03 NOTE — PROGRESS NOTES
Pre-Operative Risk Assessment for umbilical hernia repair  ARISCAT Score for Postoperative Pulmonary Complications    RESULT SUMMARY:  18 points  ARISCAT Score    Low risk  1.6% risk of in-hospital post-op pulmonary complications (composite including respiratory failure, respiratory infection, pleural effusion, atelectasis, pneumothorax, bronchospasm, aspiration pneumonitis)      INPUTS:  Age, years --> 3 = 51-80  Preoperative SpO? --> 0 = ?96%  Respiratory infection in the last month --> 0 = No  Preoperative anemia (Hgb ?10 g/dL) --> 0 = No  Surgical incision --> 15 = Upper abdominal  Duration of surgery --> 0 = <2 hrs  Emergency procedure --> 0 = No

## 2024-07-01 ENCOUNTER — OFFICE VISIT (OUTPATIENT)
Dept: FAMILY MEDICINE | Facility: CLINIC | Age: 59
End: 2024-07-01
Payer: MEDICARE

## 2024-07-01 VITALS
BODY MASS INDEX: 25.15 KG/M2 | HEIGHT: 69 IN | DIASTOLIC BLOOD PRESSURE: 80 MMHG | OXYGEN SATURATION: 95 % | HEART RATE: 89 BPM | TEMPERATURE: 98 F | WEIGHT: 169.81 LBS | SYSTOLIC BLOOD PRESSURE: 128 MMHG

## 2024-07-01 DIAGNOSIS — M54.2 CERVICAL PAIN (NECK): ICD-10-CM

## 2024-07-01 DIAGNOSIS — N52.9 ERECTILE DYSFUNCTION, UNSPECIFIED ERECTILE DYSFUNCTION TYPE: Primary | ICD-10-CM

## 2024-07-01 PROCEDURE — 99214 OFFICE O/P EST MOD 30 MIN: CPT | Mod: S$GLB,,, | Performed by: STUDENT IN AN ORGANIZED HEALTH CARE EDUCATION/TRAINING PROGRAM

## 2024-07-01 RX ORDER — TADALAFIL 20 MG/1
20 TABLET ORAL DAILY
Qty: 30 TABLET | Refills: 3 | Status: SHIPPED | OUTPATIENT
Start: 2024-07-01 | End: 2025-07-01

## 2024-07-01 NOTE — PROGRESS NOTES
"  [facility]  [address], [address]  [place] [address]    Subjective     Patient ID: Vimal Cruz is a 58 y.o. male who comes to the clinic for an acute visit.    Chief Complaint: Back Pain (Patient here to discuss medication and back and hand hip issues )    ED - needs refill of tadalafil    Neck Pain - lower right sided back pain in lumbar/hip region. Had a fusion of C5-T1 for bulging discs.     ROS negative unless stated above       Objective     Vitals:    07/01/24 1402   BP: 128/80   Pulse: 89   Temp: 98.1 °F (36.7 °C)   TempSrc: Oral   SpO2: 95%   Weight: 77 kg (169 lb 12.8 oz)   Height: 5' 9" (1.753 m)       Wt Readings from Last 3 Encounters:   07/01/24 1402 77 kg (169 lb 12.8 oz)   04/30/24 1401 79.9 kg (176 lb 2.4 oz)   04/29/24 1104 78.4 kg (172 lb 14.4 oz)        Physical Exam  Vitals reviewed.   Constitutional:       Appearance: Normal appearance.   HENT:      Head: Normocephalic and atraumatic.   Eyes:      Extraocular Movements: Extraocular movements intact.      Pupils: Pupils are equal, round, and reactive to light.   Cardiovascular:      Rate and Rhythm: Normal rate.      Pulses: Normal pulses.      Heart sounds: Normal heart sounds.   Pulmonary:      Effort: Pulmonary effort is normal. No respiratory distress.      Breath sounds: Normal breath sounds.   Musculoskeletal:         General: Tenderness present. Normal range of motion.      Cervical back: Normal range of motion and neck supple.   Skin:     General: Skin is dry.      Capillary Refill: Capillary refill takes less than 2 seconds.   Neurological:      General: No focal deficit present.      Mental Status: He is alert and oriented to person, place, and time. Mental status is at baseline.   Psychiatric:         Mood and Affect: Mood normal.         Behavior: Behavior normal.         Thought Content: Thought content normal.         Current Outpatient Medications "   Medication Instructions    albuterol (VENTOLIN HFA) 90 mcg/actuation inhaler 2 puffs, Inhalation, Every 6 hours PRN, Rescue    azelastine (ASTELIN) 137 mcg (0.1 %) nasal spray 2 sprays, Nasal, Nightly PRN    busPIRone (BUSPAR) 7.5 mg, Oral, 3 times daily PRN    cetirizine 10 mg, Oral, Nightly    ciprofloxacin-dexAMETHasone 0.3-0.1% (CIPRODEX) 0.3-0.1 % DrpS 4 drops, Both Ears, 2 times daily    diltiaZEM (CARDIZEM CD) 120 mg, Oral, Nightly    fluticasone propionate (FLONASE) 50 mcg, Each Nostril, Daily    fluticasone-umeclidin-vilanter (TRELEGY ELLIPTA) 200-62.5-25 mcg inhaler 1 puff, Inhalation, Daily    ramipriL (ALTACE) 2.5 mg, Daily    rosuvastatin (CRESTOR) 5 mg, Oral, Daily    tadalafiL (CIALIS) 20 mg, Oral, Daily           Assessment and Plan     1. Erectile dysfunction, unspecified erectile dysfunction type  -     tadalafiL (CIALIS) 20 MG Tab; Take 1 tablet (20 mg total) by mouth once daily.  Dispense: 30 tablet; Refill: 3    2. Cervical pain (neck)  -     Ambulatory referral/consult to Orthopedics; Future; Expected date: 07/08/2024        Here for an acute visit    For neck pain, needs to get back in w/ orthopedic surgery given cervical neck surgery in the past and exacerbating symptoms    For ED, refilling cialis    For HTN, at goal, continue regimen    RTC in September for regular OV         I encouraged the patient to take all medications as prescribed and to keep follow up appointments with their providers. Patient stated they had no other concerns. Questions were invited and answered. Follow up sooner if need. ED precautions given.    Myron Duaret MD  07/01/2024 2:07 PM

## 2024-07-10 ENCOUNTER — HOSPITAL ENCOUNTER (OUTPATIENT)
Dept: CARDIOLOGY | Facility: HOSPITAL | Age: 59
Discharge: HOME | End: 2024-07-10
Attending: INTERNAL MEDICINE
Payer: MEDICARE

## 2024-07-10 VITALS — WEIGHT: 169 LBS | BODY MASS INDEX: 25.03 KG/M2 | HEIGHT: 69 IN

## 2024-07-10 DIAGNOSIS — J43.2 CENTRILOBULAR EMPHYSEMA: ICD-10-CM

## 2024-07-10 DIAGNOSIS — I25.2 HISTORY OF NON-ST ELEVATION MYOCARDIAL INFARCTION (NSTEMI): ICD-10-CM

## 2024-07-10 DIAGNOSIS — Z01.810 PREOP CARDIOVASCULAR EXAM: ICD-10-CM

## 2024-07-10 LAB
AORTIC ROOT ANNULUS: 2.89 CM
AORTIC VALVE CUSP SEPERATION: 1.98 CM
ASCENDING AORTA: 2.65 CM
AV INDEX (PROSTH): 0.85
AV MEAN GRADIENT: 2 MMHG
AV PEAK GRADIENT: 5 MMHG
AV VALVE AREA BY VELOCITY RATIO: 2.48 CM²
AV VALVE AREA: 2.59 CM²
AV VELOCITY RATIO: 0.81
BSA FOR ECHO PROCEDURE: 1.93 M2
CV ECHO LV RWT: 0.54 CM
CV STRESS BASE HR: 83 BPM
DIASTOLIC BLOOD PRESSURE: 104 MMHG
DOP CALC AO PEAK VEL: 1.07 M/S
DOP CALC AO VTI: 23.5 CM
DOP CALC LVOT AREA: 3 CM2
DOP CALC LVOT DIAMETER: 1.97 CM
DOP CALC LVOT PEAK VEL: 0.87 M/S
DOP CALC LVOT STROKE VOLUME: 60.93 CM3
DOP CALC MV VTI: 25.4 CM
DOP CALCLVOT PEAK VEL VTI: 20 CM
E WAVE DECELERATION TIME: 205.37 MSEC
E/A RATIO: 1.14
E/E' RATIO: 8.11 M/S
ECHO LV POSTERIOR WALL: 1.08 CM (ref 0.6–1.1)
EJECTION FRACTION: 69 %
FRACTIONAL SHORTENING: 38 % (ref 28–44)
INTERVENTRICULAR SEPTUM: 1.08 CM (ref 0.6–1.1)
IVC DIAMETER: 0.96 CM
LA MAJOR: 3.18 CM
LA MINOR: 2.48 CM
LEFT ATRIUM AREA SYSTOLIC (APICAL 2 CHAMBER): 5.19 CM2
LEFT ATRIUM AREA SYSTOLIC (APICAL 4 CHAMBER): 9.19 CM2
LEFT ATRIUM SIZE: 3.7 CM
LEFT ATRIUM VOLUME INDEX MOD: 6.4 ML/M2
LEFT ATRIUM VOLUME MOD: 12.31 CM3
LEFT INTERNAL DIMENSION IN SYSTOLE: 2.49 CM (ref 2.1–4)
LEFT VENTRICLE DIASTOLIC VOLUME INDEX: 36.63 ML/M2
LEFT VENTRICLE DIASTOLIC VOLUME: 70.33 ML
LEFT VENTRICLE END SYSTOLIC VOLUME APICAL 2 CHAMBER: 7.06 ML
LEFT VENTRICLE END SYSTOLIC VOLUME APICAL 4 CHAMBER: 18.52 ML
LEFT VENTRICLE MASS INDEX: 74 G/M2
LEFT VENTRICLE SYSTOLIC VOLUME INDEX: 11.5 ML/M2
LEFT VENTRICLE SYSTOLIC VOLUME: 22.11 ML
LEFT VENTRICULAR INTERNAL DIMENSION IN DIASTOLE: 4.01 CM (ref 3.5–6)
LEFT VENTRICULAR MASS: 142.38 G
LV LATERAL E/E' RATIO: 7.3 M/S
LV SEPTAL E/E' RATIO: 9.13 M/S
LVED V (TEICH): 70.33 ML
LVES V (TEICH): 22.11 ML
LVOT MG: 1.59 MMHG
LVOT MV: 0.59 CM/S
MV MEAN GRADIENT: 1 MMHG
MV PEAK A VEL: 0.64 M/S
MV PEAK E VEL: 0.73 M/S
MV PEAK GRADIENT: 2 MMHG
MV STENOSIS PRESSURE HALF TIME: 59.96 MS
MV VALVE AREA BY CONTINUITY EQUATION: 2.4 CM2
MV VALVE AREA P 1/2 METHOD: 3.67 CM2
OHS CV CPX 1 MINUTE RECOVERY HEART RATE: 115 BPM
OHS CV CPX 85 PERCENT MAX PREDICTED HEART RATE MALE: 136
OHS CV CPX ESTIMATED METS: 4.2
OHS CV CPX MAX PREDICTED HEART RATE: 160
OHS CV CPX PATIENT IS FEMALE: 0
OHS CV CPX PATIENT IS MALE: 1
OHS CV CPX PEAK DIASTOLIC BLOOD PRESSURE: 111 MMHG
OHS CV CPX PEAK HEAR RATE: 122 BPM
OHS CV CPX PEAK RATE PRESSURE PRODUCT: NORMAL
OHS CV CPX PEAK SYSTOLIC BLOOD PRESSURE: 217 MMHG
OHS CV CPX PERCENT MAX PREDICTED HEART RATE ACHIEVED: 76
OHS CV CPX RATE PRESSURE PRODUCT PRESENTING: NORMAL
OHS CV RV/LV RATIO: 0.63 CM
PISA MRMAX VEL: 2.05 M/S
PISA TR MAX VEL: 1.52 M/S
PULM VEIN S/D RATIO: 1.13
PV MV: 0.57 M/S
PV PEAK D VEL: 0.39 M/S
PV PEAK GRADIENT: 3 MMHG
PV PEAK S VEL: 0.44 M/S
PV PEAK VELOCITY: 0.81 M/S
RA MAJOR: 3.32 CM
RA PRESSURE ESTIMATED: 3 MMHG
RA WIDTH: 2.17 CM
RIGHT VENTRICLE DIASTOLIC BASEL DIMENSION: 2.1 CM
RIGHT VENTRICLE DIASTOLIC LENGTH: 5.2 CM
RIGHT VENTRICLE DIASTOLIC MID DIMENSION: 1.3 CM
RIGHT VENTRICULAR END-DIASTOLIC DIMENSION: 2.51 CM
RIGHT VENTRICULAR LENGTH IN DIASTOLE (APICAL 4-CHAMBER VIEW): 5.19 CM
RV MID DIAMA: 1.28 CM
RV TB RVSP: 5 MMHG
SINUS: 2.96 CM
STJ: 2.81 CM
STRESS ECHO POST EXERCISE DUR MIN: 3 MINUTES
STRESS ECHO POST EXERCISE DUR SEC: 51 SECONDS
SYSTOLIC BLOOD PRESSURE: 159 MMHG
TDI LATERAL: 0.1 M/S
TDI SEPTAL: 0.08 M/S
TDI: 0.09 M/S
TR MAX PG: 9 MMHG
TRICUSPID ANNULAR PLANE SYSTOLIC EXCURSION: 2.3 CM
TRICUSPID VALVE PEAK A WAVE VELOCITY: 0.31 M/S
TV PEAK E VEL: 0.4 M/S
TV REST PULMONARY ARTERY PRESSURE: 12 MMHG
Z-SCORE OF LEFT VENTRICULAR DIMENSION IN END DIASTOLE: -3.01
Z-SCORE OF LEFT VENTRICULAR DIMENSION IN END SYSTOLE: -2.31

## 2024-07-10 PROCEDURE — 93325 DOPPLER ECHO COLOR FLOW MAPG: CPT

## 2024-07-19 ENCOUNTER — TELEPHONE (OUTPATIENT)
Dept: FAMILY MEDICINE | Facility: CLINIC | Age: 59
End: 2024-07-19
Payer: MEDICARE

## 2024-07-19 DIAGNOSIS — M54.2 CERVICAL PAIN (NECK): Primary | ICD-10-CM

## 2024-07-19 NOTE — TELEPHONE ENCOUNTER
----- Message from Burton Dennison MA sent at 7/19/2024  2:25 PM CDT -----  Regarding: Patient referral request  Contact: PT  This patient is requesting a referral to [facility]. If that is ok, would you please place one?  Thank you!  ----- Message -----  From: Macey Escalante LPN  Sent: 7/19/2024  10:43 AM CDT  To: Burton Dennison MA      ----- Message -----  From: Bryanna Kennedy  Sent: 7/19/2024  10:29 AM CDT  To: Felicia Sanches Staff    Type:  Patient Requesting Referral    Who Called:PT   Does the patient already have the specialty appointment scheduled?:NO   If yes, what is the date of that appointment?:N/A  Referral to What Specialty: BACK & SPINE   Reason for Referral:BACK PAIN, NUMBNESS AND LIMITED MOTION   Does the patient want the referral with a specific physician?:YES - [facility]   [facility] the [facility]  Patient Requesting a Response?:YES   Would the patient rather a call back or a response via [facility]? CALL   Best Call Back Number: [phone] (home) [phone] (work)  Additional Information: THANK YOU

## 2024-07-20 DIAGNOSIS — J44.9 CHRONIC OBSTRUCTIVE PULMONARY DISEASE, UNSPECIFIED COPD TYPE: Primary | ICD-10-CM

## 2024-07-23 RX ORDER — FLUTICASONE FUROATE, UMECLIDINIUM BROMIDE AND VILANTEROL TRIFENATATE 200; 62.5; 25 UG/1; UG/1; UG/1
1 POWDER RESPIRATORY (INHALATION) DAILY
Qty: 60 EACH | Refills: 11 | Status: SHIPPED | OUTPATIENT
Start: 2024-07-23

## 2024-07-25 ENCOUNTER — TELEPHONE (OUTPATIENT)
Dept: SURGERY | Facility: CLINIC | Age: 59
End: 2024-07-25
Payer: MEDICARE

## 2024-07-25 NOTE — TELEPHONE ENCOUNTER
----- Message from Bryanna Brent sent at 7/25/2024 10:30 AM CDT -----  Contact: PT  Type:  Needs Medical Advice    Who Called: PT   Symptoms (please be specific): PLEASE CALL WITH AN UPDATE ON HIS HERNIA PROCEDURE    Would the patient rather a call back or a response via [facility]? CALL   Best Call Back Number: [phone] (home) [phone] (work)  Additional Information: THANK YOU  .

## 2024-07-26 ENCOUNTER — OFFICE VISIT (OUTPATIENT)
Dept: SURGERY | Facility: CLINIC | Age: 59
End: 2024-07-26
Payer: MEDICARE

## 2024-07-26 VITALS
DIASTOLIC BLOOD PRESSURE: 94 MMHG | HEART RATE: 66 BPM | HEIGHT: 69 IN | BODY MASS INDEX: 25.48 KG/M2 | WEIGHT: 172 LBS | SYSTOLIC BLOOD PRESSURE: 138 MMHG

## 2024-07-26 DIAGNOSIS — K42.0 INCARCERATED UMBILICAL HERNIA: Primary | ICD-10-CM

## 2024-07-26 PROCEDURE — 99999 PR PBB SHADOW E&M-EST. PATIENT-LVL V: CPT | Mod: PBBFAC,,, | Performed by: SPECIALIST

## 2024-07-26 PROCEDURE — 99215 OFFICE O/P EST HI 40 MIN: CPT | Mod: PBBFAC | Performed by: SPECIALIST

## 2024-07-26 RX ORDER — SODIUM CHLORIDE 9 MG/ML
INJECTION, SOLUTION INTRAVENOUS CONTINUOUS
OUTPATIENT
Start: 2024-07-26

## 2024-07-26 RX ORDER — CLINDAMYCIN PHOSPHATE 900 MG/50ML
900 INJECTION, SOLUTION INTRAVENOUS
OUTPATIENT
Start: 2024-07-26

## 2024-07-26 NOTE — PROGRESS NOTES
"Subjective     Patient ID: Vimal Cruz  is a 58 y.o. male     Chief Complaint:   Chief Complaint   Patient presents with    Hernia     Had clearance. Ready to schedule.      Vitals:    07/26/24 1101   BP: (!) 138/94   Pulse: 66   Weight: 78 kg (172 lb)   Height: 5' 9" (1.753 m)       HPI     Hernia     Additional comments: Had clearance. Ready to schedule.          Last edited by Brittney Ly LPN on 7/26/2024 11:00 AM.      Patient has a known 58-year-old male who presents with a incarcerated umbilical hernia.  Has significant cardiac and pulmonary issues and was referred for perioperative evaluation by both Cardiology and Pulmonary Services.  He has been deemed an acceptable risk he returns to clinic for scheduling of his hernia repair.  On examination he still has a partially incarcerated hernia in his umbilical region and wishes to have it repaired.  Risks and benefits have been thoroughly explained to the patient he states he understands and he is here to schedule surgery.  Laparoscopic versus open techniques were explained to the patient he wishes to proceed with a minimally invasive technique.  Hernia still causing discomfort and tenderness.  Worse on lifting things during the day.  No change in bowel or bladder habits.  Past Medical History:   Diagnosis Date    Anxiety     Arthritis     "degenerative bone disease"    COPD (chronic obstructive pulmonary disease)     Emphysema of lung     Hypertension     Medication intolerance, atorvastatin 20 mg with muscle 04/24/2024     Past Surgical History:   Procedure Laterality Date    ANTERIOR SPINAL FUSION  2016    c5    HERNIA REPAIR       Family History   Problem Relation Name Age of Onset    Dementia Mother      Stroke Mother  77        after stopping zyprexa    Emphysema Maternal Grandmother  56    Throat cancer Maternal Grandfather      Cancer Maternal Grandfather  87        colon    Dementia Paternal Grandmother      Stroke Paternal Grandfather   "     Past Surgical History:   Procedure Laterality Date    ANTERIOR SPINAL FUSION  2016    c5    HERNIA REPAIR       Social History     Socioeconomic History    Marital status:    Tobacco Use    Smoking status: Former     Average packs/day: 1 pack/day for 41.3 years (41.3 ttl pk-yrs)     Types: Cigarettes     Start date: 11/21/1978    Smokeless tobacco: Never   Substance and Sexual Activity    Alcohol use: Not Currently     Comment: socially    Drug use: Never    Sexual activity: Yes     Partners: Female     Social Determinants of Health     Financial Resource Strain: Low Risk  (4/15/2024)    Overall Financial Resource Strain (CARDIA)     Difficulty of Paying Living Expenses: Not hard at all   Food Insecurity: No Food Insecurity (4/15/2024)    Hunger Vital Sign     Worried About Running Out of Food in the Last Year: Never true     Ran Out of Food in the Last Year: Never true   Transportation Needs: No Transportation Needs (4/15/2024)    PRAPARE - Transportation     Lack of Transportation (Medical): No     Lack of Transportation (Non-Medical): No   Physical Activity: Sufficiently Active (4/15/2024)    Exercise Vital Sign     Days of Exercise per Week: 6 days     Minutes of Exercise per Session: 30 min   Stress: No Stress Concern Present (4/15/2024)    [employer] [place] [employer] - Occupational Stress Questionnaire     Feeling of Stress : Only a little   Housing Stability: Unknown (4/15/2024)    Housing Stability Vital Sign     Unable to Pay for Housing in the Last Year: No        Current Outpatient Medications:     albuterol (VENTOLIN HFA) 90 mcg/actuation inhaler, Inhale 2 puffs into the lungs every 6 (six) hours as needed for Shortness of Breath. Rescue, Disp: 18 g, Rfl: 11    azelastine (ASTELIN) 137 mcg (0.1 %) nasal spray, 2 sprays by Nasal route nightly as needed for Rhinitis., Disp: , Rfl:     busPIRone (BUSPAR) 15 MG tablet, Take 7.5 mg by mouth 3 (three) times daily as needed., Disp: ,  Rfl:     cetirizine 10 mg chewable tablet, Take 10 mg by mouth every evening., Disp: , Rfl:     diltiaZEM (CARDIZEM CD) 120 MG Cp24, Take 120 mg by mouth every evening., Disp: , Rfl:     fluticasone propionate (FLONASE) 50 mcg/actuation nasal spray, 1 spray (50 mcg total) by Each Nostril route once daily., Disp: 16 g, Rfl: 11    fluticasone-umeclidin-vilanter (TRELEGY ELLIPTA) 200-62.5-25 mcg inhaler, Inhale 1 puff into the lungs once daily., Disp: 60 each, Rfl: 11    ramipriL (ALTACE) 2.5 MG capsule, 2.5 mg once daily., Disp: , Rfl:     rosuvastatin (CRESTOR) 5 MG tablet, Take 1 tablet (5 mg total) by mouth once daily., Disp: 90 tablet, Rfl: 3    tadalafiL (CIALIS) 20 MG Tab, Take 1 tablet (20 mg total) by mouth once daily., Disp: 30 tablet, Rfl: 3   Review of patient's allergies indicates:   Allergen Reactions    Amoxicillin Itching     Only if not taken with food    Penicillin Itching     Only if taken without food            Review of Systems   Gastrointestinal:  Positive for abdominal pain.        Patient with complaints of discomfort in his periumbilical region with a bulge in his umbilical region consistent with a hernia that he is known to have.  He reports he is not able to reduce it in his tender to the touch.        I have reviewed the following:     Details / Date    []   Labs     []   Micro     []   Pathology     []   Imaging     []   Cardiology Procedures     [x]   Other Outside referral note was initially evaluated and consultation notes were noted by Cardiology and Pulmonary.        Objective         Physical Exam  Vitals and nursing note reviewed.   Constitutional:       Appearance: Normal appearance. He is normal weight.   HENT:      Head: Normocephalic and atraumatic.      Nose: Nose normal.      Mouth/Throat:      Mouth: Mucous membranes are moist.   Eyes:      Extraocular Movements: Extraocular movements intact.      Pupils: Pupils are equal, round, and reactive to light.   Cardiovascular:       Rate and Rhythm: Normal rate.   Pulmonary:      Effort: Pulmonary effort is normal.   Abdominal:      General: Abdomen is flat.      Palpations: Abdomen is soft.      Hernia: A hernia is present. Hernia is present in the umbilical area.          Comments: Patient has a non reduced umbilical hernia that is tender to the palpation in touch.  It was partially reducible.   Musculoskeletal:         General: Normal range of motion.      Cervical back: Normal range of motion.   Skin:     General: Skin is warm.   Neurological:      General: No focal deficit present.      Mental Status: He is alert and oriented to person, place, and time. Mental status is at baseline.   Psychiatric:         Mood and Affect: Mood normal.         Behavior: Behavior normal.          Assessment and Plan     1. Incarcerated umbilical hernia  -     Case Request Operating Room: REPAIR, HERNIA, UMBILICAL, LAPAROSCOPIC    Other orders  -     Vital signs; Standing  -     Insert peripheral IV; Standing  -     Diet NPO; Standing  -     0.9%  NaCl infusion  -     IP VTE LOW RISK PATIENT; Standing  -     Full code; Standing  -     Place in Outpatient; Standing  -     Place STEFAN hose; Standing  -     clindamycin in D5W 900 mg/50 mL IVPB 900 mg       Orders Placed This Encounter   Procedures    Insert peripheral IV      Patient with a partially reducible umbilical hernia causing discomfort.  Discussed repair with the patient he states he wishes to have repair.  He has been seen and evaluated by Cardiology and Pulmonary and deemed acceptable risks.  Plan laparoscopic outpatient umbilical hernia repair  An After Visit Summary was printed and given to the patient.       Brian Pitre,MD [facility]  [address] [place] [address],[place] [id number]  [phone]     This note was created using [employer] direct voice recognition software. Note may have occasional typographical errors that may not have been identified and edited despite  initial review prior to signing.     Patient instructed that best way to communicate with my office staff is for patient to get on the [facility] patient portal to expedite communication and communication issues that may occur.  Patient was given instructions on how to get on the portal.  I encouraged patient to obtain portal access as well.  Ultimately it is up to the patient to obtain access.  Patient voiced understanding.

## 2024-07-26 NOTE — H&P (VIEW-ONLY)
"Subjective     Patient ID: Vimal Cruz  is a 58 y.o. male     Chief Complaint:   Chief Complaint   Patient presents with    Hernia     Had clearance. Ready to schedule.      Vitals:    07/26/24 1101   BP: (!) 138/94   Pulse: 66   Weight: 78 kg (172 lb)   Height: 5' 9" (1.753 m)       HPI     Hernia     Additional comments: Had clearance. Ready to schedule.          Last edited by Brittney Ly LPN on 7/26/2024 11:00 AM.      Patient has a known 58-year-old male who presents with a incarcerated umbilical hernia.  Has significant cardiac and pulmonary issues and was referred for perioperative evaluation by both Cardiology and Pulmonary Services.  He has been deemed an acceptable risk he returns to clinic for scheduling of his hernia repair.  On examination he still has a partially incarcerated hernia in his umbilical region and wishes to have it repaired.  Risks and benefits have been thoroughly explained to the patient he states he understands and he is here to schedule surgery.  Laparoscopic versus open techniques were explained to the patient he wishes to proceed with a minimally invasive technique.  Hernia still causing discomfort and tenderness.  Worse on lifting things during the day.  No change in bowel or bladder habits.  Past Medical History:   Diagnosis Date    Anxiety     Arthritis     "degenerative bone disease"    COPD (chronic obstructive pulmonary disease)     Emphysema of lung     Hypertension     Medication intolerance, atorvastatin 20 mg with muscle 04/24/2024     Past Surgical History:   Procedure Laterality Date    ANTERIOR SPINAL FUSION  2016    c5    HERNIA REPAIR       Family History   Problem Relation Name Age of Onset    Dementia Mother      Stroke Mother  77        after stopping zyprexa    Emphysema Maternal Grandmother  56    Throat cancer Maternal Grandfather      Cancer Maternal Grandfather  87        colon    Dementia Paternal Grandmother      Stroke Paternal Grandfather   "     Past Surgical History:   Procedure Laterality Date    ANTERIOR SPINAL FUSION  2016    c5    HERNIA REPAIR       Social History     Socioeconomic History    Marital status:    Tobacco Use    Smoking status: Former     Average packs/day: 1 pack/day for 41.3 years (41.3 ttl pk-yrs)     Types: Cigarettes     Start date: 11/21/1978    Smokeless tobacco: Never   Substance and Sexual Activity    Alcohol use: Not Currently     Comment: socially    Drug use: Never    Sexual activity: Yes     Partners: Female     Social Determinants of Health     Financial Resource Strain: Low Risk  (4/15/2024)    Overall Financial Resource Strain (CARDIA)     Difficulty of Paying Living Expenses: Not hard at all   Food Insecurity: No Food Insecurity (4/15/2024)    Hunger Vital Sign     Worried About Running Out of Food in the Last Year: Never true     Ran Out of Food in the Last Year: Never true   Transportation Needs: No Transportation Needs (4/15/2024)    PRAPARE - Transportation     Lack of Transportation (Medical): No     Lack of Transportation (Non-Medical): No   Physical Activity: Sufficiently Active (4/15/2024)    Exercise Vital Sign     Days of Exercise per Week: 6 days     Minutes of Exercise per Session: 30 min   Stress: No Stress Concern Present (4/15/2024)    [employer] - Occupational Stress Questionnaire     Feeling of Stress : Only a little   Housing Stability: Unknown (4/15/2024)    Housing Stability Vital Sign     Unable to Pay for Housing in the Last Year: No        Current Outpatient Medications:     albuterol (VENTOLIN HFA) 90 mcg/actuation inhaler, Inhale 2 puffs into the lungs every 6 (six) hours as needed for Shortness of Breath. Rescue, Disp: 18 g, Rfl: 11    azelastine (ASTELIN) 137 mcg (0.1 %) nasal spray, 2 sprays by Nasal route nightly as needed for Rhinitis., Disp: , Rfl:     busPIRone (BUSPAR) 15 MG tablet, Take 7.5 mg by mouth 3 (three) times daily as needed., Disp: ,  Rfl:     cetirizine 10 mg chewable tablet, Take 10 mg by mouth every evening., Disp: , Rfl:     diltiaZEM (CARDIZEM CD) 120 MG Cp24, Take 120 mg by mouth every evening., Disp: , Rfl:     fluticasone propionate (FLONASE) 50 mcg/actuation nasal spray, 1 spray (50 mcg total) by Each Nostril route once daily., Disp: 16 g, Rfl: 11    fluticasone-umeclidin-vilanter (TRELEGY ELLIPTA) 200-62.5-25 mcg inhaler, Inhale 1 puff into the lungs once daily., Disp: 60 each, Rfl: 11    ramipriL (ALTACE) 2.5 MG capsule, 2.5 mg once daily., Disp: , Rfl:     rosuvastatin (CRESTOR) 5 MG tablet, Take 1 tablet (5 mg total) by mouth once daily., Disp: 90 tablet, Rfl: 3    tadalafiL (CIALIS) 20 MG Tab, Take 1 tablet (20 mg total) by mouth once daily., Disp: 30 tablet, Rfl: 3   Review of patient's allergies indicates:   Allergen Reactions    Amoxicillin Itching     Only if not taken with food    Penicillin Itching     Only if taken without food            Review of Systems   Gastrointestinal:  Positive for abdominal pain.        Patient with complaints of discomfort in his periumbilical region with a bulge in his umbilical region consistent with a hernia that he is known to have.  He reports he is not able to reduce it in his tender to the touch.        I have reviewed the following:     Details / Date    []   Labs     []   Micro     []   Pathology     []   Imaging     []   Cardiology Procedures     [x]   Other Outside referral note was initially evaluated and consultation notes were noted by Cardiology and Pulmonary.        Objective         Physical Exam  Vitals and nursing note reviewed.   Constitutional:       Appearance: Normal appearance. He is normal weight.   HENT:      Head: Normocephalic and atraumatic.      Nose: Nose normal.      Mouth/Throat:      Mouth: Mucous membranes are moist.   Eyes:      Extraocular Movements: Extraocular movements intact.      Pupils: Pupils are equal, round, and reactive to light.   Cardiovascular:       Rate and Rhythm: Normal rate.   Pulmonary:      Effort: Pulmonary effort is normal.   Abdominal:      General: Abdomen is flat.      Palpations: Abdomen is soft.      Hernia: A hernia is present. Hernia is present in the umbilical area.          Comments: Patient has a non reduced umbilical hernia that is tender to the palpation in touch.  It was partially reducible.   Musculoskeletal:         General: Normal range of motion.      Cervical back: Normal range of motion.   Skin:     General: Skin is warm.   Neurological:      General: No focal deficit present.      Mental Status: He is alert and oriented to person, place, and time. Mental status is at baseline.   Psychiatric:         Mood and Affect: Mood normal.         Behavior: Behavior normal.          Assessment and Plan     1. Incarcerated umbilical hernia  -     Case Request Operating Room: REPAIR, HERNIA, UMBILICAL, LAPAROSCOPIC    Other orders  -     Vital signs; Standing  -     Insert peripheral IV; Standing  -     Diet NPO; Standing  -     0.9%  NaCl infusion  -     IP VTE LOW RISK PATIENT; Standing  -     Full code; Standing  -     Place in Outpatient; Standing  -     Place STEFAN hose; Standing  -     clindamycin in D5W 900 mg/50 mL IVPB 900 mg       Orders Placed This Encounter   Procedures    Insert peripheral IV      Patient with a partially reducible umbilical hernia causing discomfort.  Discussed repair with the patient he states he wishes to have repair.  He has been seen and evaluated by Cardiology and Pulmonary and deemed acceptable risks.  Plan laparoscopic outpatient umbilical hernia repair  An After Visit Summary was printed and given to the patient.       Brian Pitre,MD [facility]  [address] [place] [address],[place] [id number]  [phone]     This note was created using [employer] direct voice recognition software. Note may have occasional typographical errors that may not have been identified and edited despite  initial review prior to signing.     Patient instructed that best way to communicate with my office staff is for patient to get on the [facility] patient portal to expedite communication and communication issues that may occur.  Patient was given instructions on how to get on the portal.  I encouraged patient to obtain portal access as well.  Ultimately it is up to the patient to obtain access.  Patient voiced understanding.

## 2024-08-12 ENCOUNTER — HOSPITAL ENCOUNTER (OUTPATIENT)
Dept: PREADMISSION TESTING | Facility: HOSPITAL | Age: 59
Discharge: HOME | End: 2024-08-12
Attending: SPECIALIST
Payer: MEDICARE

## 2024-08-12 ENCOUNTER — ANESTHESIA EVENT (OUTPATIENT)
Dept: SURGERY | Facility: HOSPITAL | Age: 59
End: 2024-08-12
Payer: MEDICARE

## 2024-08-12 VITALS — BODY MASS INDEX: 25.48 KG/M2 | WEIGHT: 172 LBS | HEIGHT: 69 IN

## 2024-08-12 PROCEDURE — 99900103 DSU ONLY-NO CHARGE-INITIAL HR (STAT)

## 2024-08-12 NOTE — DISCHARGE INSTRUCTIONS
A nurse will call you prior to surgery day with a time to arrive. Use chlorhexidine wash the night before and morning of surgery. Do not eat or drink past midnight. No gum, no candy, no food, or water. If you eat or drink there is a possibility your surgery will  be canceled. If you have any questions please call our Pre-op department. [phone].

## 2024-08-12 NOTE — ANESTHESIA PREPROCEDURE EVALUATION
08/12/2024  Vimal Cruz is a 58 y.o., male.   has a past surgical history that includes Hernia repair and Anterior spinal fusion (2016).       Pre-op Assessment    I have reviewed the Patient Summary Reports.     I have reviewed the Nursing Notes. I have reviewed the NPO Status.   I have reviewed the Medications.     Review of Systems  Anesthesia Hx:  No problems with previous Anesthesia             Denies Family Hx of Anesthesia complications.    Denies Personal Hx of Anesthesia complications.                    Social:  Former Smoker       Hematology/Oncology:  Hematology Normal   Oncology Normal                                   EENT/Dental:  EENT/Dental Normal           Cardiovascular:     Hypertension  Past MI CAD           hyperlipidemia DUMONT  ECG has been reviewed. 4/24 ECG- NSR  7/24 stress echo- neg for ischemia  Cleared by cardiology                         Pulmonary:   COPD   Shortness of breath   Cleared by pulmonary  Needs stress dose steroids  PFTs (5/4/20)-FEV1 41% (with significant BD response), DLCO 39%; FeNO 63.  PFTs (9/9/20)-FEV1 44%, DLCO 40%.                  Renal/:    BPH              Hepatic/GI:  Hepatic/GI Normal                 Musculoskeletal:  Arthritis               Neurological:  Neurology Normal                                      Endocrine:  Endocrine Normal            Psych:  Psychiatric History  depression              Physical Exam  General: Well nourished, Cooperative, Alert and Oriented    Airway:  Mallampati: II   Mouth Opening: Normal  TM Distance: Normal  Tongue: Normal  Neck ROM: Normal ROM    Dental:  Intact    Chest/Lungs:  Clear to auscultation, Normal Respiratory Rate    Heart:  Rate: Normal  Rhythm: Regular Rhythm    Anesthesia Plan  Type of Anesthesia, risks & benefits discussed:    Anesthesia Type: Gen ETT  Intra-op Monitoring Plan: Standard ASA  Monitors  Post Op Pain Control Plan: IV/PO Opioids PRN  Induction:  IV  Airway Plan: Video  Informed Consent: Informed consent signed with the Patient and all parties understand the risks and agree with anesthesia plan.  All questions answered.   ASA Score: 3  Day of Surgery Review of History & Physical: H&P Update referred to the surgeon/provider.    Ready For Surgery From Anesthesia Perspective.   .

## 2024-08-14 ENCOUNTER — HOSPITAL ENCOUNTER (OUTPATIENT)
Facility: HOSPITAL | Age: 59
Discharge: HOME | End: 2024-08-14
Attending: SPECIALIST | Admitting: SPECIALIST
Payer: MEDICARE

## 2024-08-14 ENCOUNTER — ANESTHESIA (OUTPATIENT)
Dept: SURGERY | Facility: HOSPITAL | Age: 59
End: 2024-08-14
Payer: MEDICARE

## 2024-08-14 DIAGNOSIS — K42.0 INCARCERATED UMBILICAL HERNIA: Primary | ICD-10-CM

## 2024-08-14 PROCEDURE — 63600175 PHARM REV CODE 636 W HCPCS: Performed by: NURSE ANESTHETIST, CERTIFIED REGISTERED

## 2024-08-14 PROCEDURE — 63600175 PHARM REV CODE 636 W HCPCS: Mod: JZ,JG | Performed by: SPECIALIST

## 2024-08-14 PROCEDURE — 63600175 PHARM REV CODE 636 W HCPCS: Performed by: SPECIALIST

## 2024-08-14 PROCEDURE — 27201423 OPTIME MED/SURG SUP & DEVICES STERILE SUPPLY: Performed by: SPECIALIST

## 2024-08-14 PROCEDURE — 36000709 HC OR TIME LEV III EA ADD 15 MIN: Performed by: SPECIALIST

## 2024-08-14 PROCEDURE — 71000039 HC RECOVERY, EACH ADD'L HOUR: Performed by: SPECIALIST

## 2024-08-14 PROCEDURE — 25000003 PHARM REV CODE 250: Performed by: NURSE ANESTHETIST, CERTIFIED REGISTERED

## 2024-08-14 PROCEDURE — 37000008 HC ANESTHESIA 1ST 15 MINUTES: Performed by: SPECIALIST

## 2024-08-14 PROCEDURE — 37000009 HC ANESTHESIA EA ADD 15 MINS: Performed by: SPECIALIST

## 2024-08-14 PROCEDURE — 49592 RPR AA HRN 1ST < 3 NCR/STRN: CPT | Mod: ,,, | Performed by: SPECIALIST

## 2024-08-14 PROCEDURE — 36000708 HC OR TIME LEV III 1ST 15 MIN: Performed by: SPECIALIST

## 2024-08-14 PROCEDURE — 71000033 HC RECOVERY, INTIAL HOUR: Performed by: SPECIALIST

## 2024-08-14 PROCEDURE — C1781 MESH (IMPLANTABLE): HCPCS | Performed by: SPECIALIST

## 2024-08-14 PROCEDURE — 71000015 HC POSTOP RECOV 1ST HR: Performed by: SPECIALIST

## 2024-08-14 DEVICE — MESH SYMBOTEX HERN RND 12CM: Type: IMPLANTABLE DEVICE | Site: ABDOMEN | Status: FUNCTIONAL

## 2024-08-14 RX ORDER — CLINDAMYCIN PHOSPHATE 900 MG/50ML
900 INJECTION, SOLUTION INTRAVENOUS
Status: COMPLETED | OUTPATIENT
Start: 2024-08-14 | End: 2024-08-14

## 2024-08-14 RX ORDER — ROCURONIUM BROMIDE 10 MG/ML
INJECTION, SOLUTION INTRAVENOUS
Status: DISCONTINUED | OUTPATIENT
Start: 2024-08-14 | End: 2024-08-14

## 2024-08-14 RX ORDER — MIDAZOLAM HYDROCHLORIDE 1 MG/ML
INJECTION INTRAMUSCULAR; INTRAVENOUS
Status: DISCONTINUED | OUTPATIENT
Start: 2024-08-14 | End: 2024-08-14

## 2024-08-14 RX ORDER — OXYCODONE AND ACETAMINOPHEN 5; 325 MG/1; MG/1
1 TABLET ORAL EVERY 4 HOURS PRN
Qty: 20 TABLET | Refills: 0 | Status: SHIPPED | OUTPATIENT
Start: 2024-08-14 | End: 2024-08-23

## 2024-08-14 RX ORDER — KETOROLAC TROMETHAMINE 30 MG/ML
INJECTION, SOLUTION INTRAMUSCULAR; INTRAVENOUS
Status: DISCONTINUED | OUTPATIENT
Start: 2024-08-14 | End: 2024-08-14

## 2024-08-14 RX ORDER — SODIUM CHLORIDE, SODIUM LACTATE, POTASSIUM CHLORIDE, CALCIUM CHLORIDE 600; 310; 30; 20 MG/100ML; MG/100ML; MG/100ML; MG/100ML
125 INJECTION, SOLUTION INTRAVENOUS CONTINUOUS
Status: DISCONTINUED | OUTPATIENT
Start: 2024-08-14 | End: 2024-08-14 | Stop reason: HOSPADM

## 2024-08-14 RX ORDER — OXYCODONE HYDROCHLORIDE 5 MG/1
5 TABLET ORAL ONCE AS NEEDED
Status: DISCONTINUED | OUTPATIENT
Start: 2024-08-14 | End: 2024-08-14 | Stop reason: HOSPADM

## 2024-08-14 RX ORDER — MEPERIDINE HYDROCHLORIDE 50 MG/ML
12.5 INJECTION INTRAMUSCULAR; INTRAVENOUS; SUBCUTANEOUS EVERY 10 MIN PRN
Status: DISCONTINUED | OUTPATIENT
Start: 2024-08-14 | End: 2024-08-14 | Stop reason: HOSPADM

## 2024-08-14 RX ORDER — LIDOCAINE HYDROCHLORIDE 20 MG/ML
INJECTION, SOLUTION EPIDURAL; INFILTRATION; INTRACAUDAL; PERINEURAL
Status: DISCONTINUED | OUTPATIENT
Start: 2024-08-14 | End: 2024-08-14

## 2024-08-14 RX ORDER — FENTANYL CITRATE 50 UG/ML
INJECTION, SOLUTION INTRAMUSCULAR; INTRAVENOUS
Status: DISCONTINUED | OUTPATIENT
Start: 2024-08-14 | End: 2024-08-14

## 2024-08-14 RX ORDER — HYDROMORPHONE HYDROCHLORIDE 1 MG/ML
0.5 INJECTION, SOLUTION INTRAMUSCULAR; INTRAVENOUS; SUBCUTANEOUS EVERY 5 MIN PRN
Status: DISCONTINUED | OUTPATIENT
Start: 2024-08-14 | End: 2024-08-14 | Stop reason: HOSPADM

## 2024-08-14 RX ORDER — ACETAMINOPHEN 10 MG/ML
INJECTION, SOLUTION INTRAVENOUS
Status: DISCONTINUED | OUTPATIENT
Start: 2024-08-14 | End: 2024-08-14

## 2024-08-14 RX ORDER — SODIUM CHLORIDE, SODIUM LACTATE, POTASSIUM CHLORIDE, CALCIUM CHLORIDE 600; 310; 30; 20 MG/100ML; MG/100ML; MG/100ML; MG/100ML
INJECTION, SOLUTION INTRAVENOUS CONTINUOUS
Status: DISCONTINUED | OUTPATIENT
Start: 2024-08-14 | End: 2024-08-14 | Stop reason: HOSPADM

## 2024-08-14 RX ORDER — PROPOFOL 10 MG/ML
VIAL (ML) INTRAVENOUS
Status: DISCONTINUED | OUTPATIENT
Start: 2024-08-14 | End: 2024-08-14

## 2024-08-14 RX ORDER — SODIUM CHLORIDE 9 MG/ML
INJECTION, SOLUTION INTRAVENOUS CONTINUOUS
Status: DISCONTINUED | OUTPATIENT
Start: 2024-08-14 | End: 2024-08-14 | Stop reason: HOSPADM

## 2024-08-14 RX ORDER — GLUCAGON 1 MG
1 KIT INJECTION
Status: DISCONTINUED | OUTPATIENT
Start: 2024-08-14 | End: 2024-08-14 | Stop reason: HOSPADM

## 2024-08-14 RX ORDER — ONDANSETRON HYDROCHLORIDE 2 MG/ML
INJECTION, SOLUTION INTRAVENOUS
Status: DISCONTINUED | OUTPATIENT
Start: 2024-08-14 | End: 2024-08-14

## 2024-08-14 RX ORDER — ONDANSETRON HYDROCHLORIDE 2 MG/ML
4 INJECTION, SOLUTION INTRAVENOUS DAILY PRN
Status: DISCONTINUED | OUTPATIENT
Start: 2024-08-14 | End: 2024-08-14 | Stop reason: HOSPADM

## 2024-08-14 RX ORDER — EPHEDRINE SULFATE 50 MG/ML
INJECTION, SOLUTION INTRAVENOUS
Status: DISCONTINUED | OUTPATIENT
Start: 2024-08-14 | End: 2024-08-14

## 2024-08-14 RX ORDER — LIDOCAINE HYDROCHLORIDE 10 MG/ML
1 INJECTION, SOLUTION EPIDURAL; INFILTRATION; INTRACAUDAL; PERINEURAL ONCE
Status: DISCONTINUED | OUTPATIENT
Start: 2024-08-14 | End: 2024-08-14 | Stop reason: HOSPADM

## 2024-08-14 RX ADMIN — SUGAMMADEX 50 MG: 100 INJECTION, SOLUTION INTRAVENOUS at 09:08

## 2024-08-14 RX ADMIN — KETOROLAC TROMETHAMINE 30 MG: 30 INJECTION, SOLUTION INTRAMUSCULAR; INTRAVENOUS at 09:08

## 2024-08-14 RX ADMIN — SODIUM CHLORIDE, POTASSIUM CHLORIDE, SODIUM LACTATE AND CALCIUM CHLORIDE: 600; 310; 30; 20 INJECTION, SOLUTION INTRAVENOUS at 07:08

## 2024-08-14 RX ADMIN — HYDROMORPHONE HYDROCHLORIDE 0.5 MG: 1 INJECTION, SOLUTION INTRAMUSCULAR; INTRAVENOUS; SUBCUTANEOUS at 09:08

## 2024-08-14 RX ADMIN — ONDANSETRON 4 MG: 2 INJECTION INTRAMUSCULAR; INTRAVENOUS at 08:08

## 2024-08-14 RX ADMIN — EPHEDRINE SULFATE 15 MG: 50 INJECTION INTRAVENOUS at 08:08

## 2024-08-14 RX ADMIN — ACETAMINOPHEN 1000 MG: 10 INJECTION, SOLUTION INTRAVENOUS at 08:08

## 2024-08-14 RX ADMIN — MIDAZOLAM HYDROCHLORIDE 2 MG: 1 INJECTION, SOLUTION INTRAMUSCULAR; INTRAVENOUS at 08:08

## 2024-08-14 RX ADMIN — FENTANYL CITRATE 50 MCG: 50 INJECTION INTRAMUSCULAR; INTRAVENOUS at 09:08

## 2024-08-14 RX ADMIN — LIDOCAINE HYDROCHLORIDE 100 MG: 20 INJECTION, SOLUTION EPIDURAL; INFILTRATION; INTRACAUDAL; PERINEURAL at 08:08

## 2024-08-14 RX ADMIN — METHYLPREDNISOLONE SODIUM SUCCINATE 125 MG: 125 INJECTION, POWDER, FOR SOLUTION INTRAMUSCULAR; INTRAVENOUS at 08:08

## 2024-08-14 RX ADMIN — FENTANYL CITRATE 100 MCG: 50 INJECTION INTRAMUSCULAR; INTRAVENOUS at 08:08

## 2024-08-14 RX ADMIN — PROPOFOL 200 MG: 10 INJECTION, EMULSION INTRAVENOUS at 08:08

## 2024-08-14 RX ADMIN — CLINDAMYCIN IN 5 PERCENT DEXTROSE 900 MG: 18 INJECTION, SOLUTION INTRAVENOUS at 08:08

## 2024-08-14 RX ADMIN — HYDROMORPHONE HYDROCHLORIDE 0.5 MG: 1 INJECTION, SOLUTION INTRAMUSCULAR; INTRAVENOUS; SUBCUTANEOUS at 10:08

## 2024-08-14 RX ADMIN — ROCURONIUM BROMIDE 40 MG: 10 INJECTION, SOLUTION INTRAVENOUS at 08:08

## 2024-08-14 NOTE — PLAN OF CARE
Patient taken to vehicle via wheelchair in stable condition. Escorted by spouse who will be driving patient home. No complaints or further questions at this time.

## 2024-08-14 NOTE — ANESTHESIA PROCEDURE NOTES
Intubation    Date/Time: 8/14/2024 8:30 AM    Performed by: Skye Lange CRNA  Authorized by: Armando Juarez MD    Intubation:     Induction:  Intravenous    Intubated:  Postinduction    Mask Ventilation:  Easy mask    Attempts:  1    Attempted By:  CRNA    Method of Intubation:  Video laryngoscopy    Blade:  Moeller 3    Laryngeal View Grade: Grade I - full view of cords      Difficult Airway Encountered?: No      Complications:  None    Airway Device:  Oral endotracheal tube    Airway Device Size:  7.0    Style/Cuff Inflation:  Cuffed (inflated to minimal occlusive pressure)    Tube secured:  22    Secured at:  The lips    Placement Verified By:  Capnometry    Complicating Factors:  None    Findings Post-Intubation:  BS equal bilateral and atraumatic/condition of teeth unchanged

## 2024-08-14 NOTE — OP NOTE
Patient: Vimal Cruz     Date of Procedure: 8/14/2024    Procedure:  Operative repair of an incarcerated umbilical hernia    Surgeon: Roel Bonilla MD    Assistant: None    Pre-op Diagnosis: Incarcerated umbilical hernia [K42.0]     Post-op Diagnosis: Incarcerated umbilical hernia [K42.0]    Procedure in Detail:  After informed consent was obtained, consent form signed, and questions answered, the surgical site was identified and marked appropriately.  The patient was then taken to the operating room where general endotracheal anesthesia was induced. Prophylactic IV antibiotics were administered.  The patient was positioned and the surgical field was prepped and draped in the usual sterile fashion. A thorough time-out procedure was performed with the surgical team.    Using an Optiview technique a 11 mm trocar was placed in the left subcostal region without complication.  Abdomen was insufflated to 15 mm of mercury with CO2 following which under direct visualization 2 separate 5 mm trocar was placed in the anterior axillary line on the left side.  This was done under direct visualization and no visceral injuries were noted.  Abdomen was then explored.  There was umbilical defect that was noted proximally 2 cm in diameter with preperitoneal fat incarcerated within it.  No omentum was involved.  Preperitoneal fat was next reduced out of the umbilical hernia without difficulty.  Next the preperitoneal fat was taken down in a cephalad fashion using a harmonic scalpel proximally 2 in above the umbilical region.  Inferior medial ligament was taken down as well.  This provided a flat surface for mesh to be a needle to.  12 cm round Covidien Symbotex mesh was then placed in the peritoneal cavity and anchored with the rough side towards the peritoneal lining smooth side towards bowel with 2 separate circumferential rows of a sober tacks.  These were placed 2 cm apart.  Mesh was excellently position overlying the defect  centrally.  Hemostasis was noted.  Peritoneal cavity was deflated under direct visualization allowing the mesh to only only interact with omentum.  Trocar was removed during this process.  Skin was closed with interrupted staples.  At the end the procedure all counts were correct x2 and patient was brought to the recovery room, extubated in hemodynamically stable condition.    Dressings were applied and the patient was awakened and transferred to the recovery room in stable condition. There were no immediate complications.    Specimen:  None    EBL:  Less than 2 cc    Complications: None    This note was created using [employer] direct voice recognition software. Note may have occasional typographical errors that may not have been identified and edited despite initial review prior to signing.

## 2024-08-14 NOTE — ANESTHESIA POSTPROCEDURE EVALUATION
Anesthesia Post Evaluation    Patient: Vimal Cruz    Procedure(s) Performed: Procedure(s) (LRB):  REPAIR, HERNIA, UMBILICAL, LAPAROSCOPIC (N/A)    Final Anesthesia Type: general      Patient location during evaluation: PACU  Patient participation: Yes- Able to Participate  Level of consciousness: awake and alert and oriented  Post-procedure vital signs: reviewed and stable  Pain management: adequate  Airway patency: patent    PONV status at discharge: No PONV  Anesthetic complications: no      Cardiovascular status: blood pressure returned to baseline and hemodynamically stable  Respiratory status: spontaneous ventilation and room air  Hydration status: euvolemic  Follow-up not needed.          Vitals Value Taken Time   /95 08/14/24 0941   Temp 35.6 °C (96 °F) 08/14/24 0930   Pulse 98 08/14/24 0944   Resp 13 08/14/24 0944   SpO2 93 % 08/14/24 0944   Vitals shown include unfiled device data.      No case tracking events are documented in the log.      Pain/Yarelis Score: Yarelis Score: 9 (8/14/2024  9:40 AM)

## 2024-08-14 NOTE — DISCHARGE SUMMARY
[facility]  Discharge Note  Short Stay    Procedure(s) (LRB):  REPAIR, HERNIA, UMBILICAL, LAPAROSCOPIC (N/A)      OUTCOME: Patient tolerated treatment/procedure well without complication and is now ready for discharge.    DISPOSITION: Home or Self Care    FINAL DIAGNOSIS:  Incarcerated umbilical hernia 2 cm in diameter    FOLLOWUP: In clinic in 10 days    DISCHARGE INSTRUCTIONS:  No discharge procedures on file.     TIME SPENT ON DISCHARGE:  10 minutes

## 2024-08-15 ENCOUNTER — TELEPHONE (OUTPATIENT)
Dept: SURGERY | Facility: CLINIC | Age: 59
End: 2024-08-15
Payer: MEDICARE

## 2024-08-15 VITALS
RESPIRATION RATE: 13 BRPM | WEIGHT: 172 LBS | TEMPERATURE: 96 F | BODY MASS INDEX: 25.48 KG/M2 | DIASTOLIC BLOOD PRESSURE: 88 MMHG | OXYGEN SATURATION: 95 % | SYSTOLIC BLOOD PRESSURE: 153 MMHG | HEART RATE: 96 BPM | HEIGHT: 69 IN

## 2024-08-15 NOTE — TELEPHONE ENCOUNTER
----- Message from Katina Whatley sent at 8/15/2024 10:15 AM CDT -----  Regarding: girdle  Contact: pt  Type:  Needs Medical Advice    Who Called: pt    Best Call Back Number: [phone]    Additional Information:  pt has a question about the girdle

## 2024-08-19 ENCOUNTER — PATIENT MESSAGE (OUTPATIENT)
Dept: CARDIOLOGY | Facility: CLINIC | Age: 59
End: 2024-08-19
Payer: MEDICARE

## 2024-08-19 ENCOUNTER — TELEPHONE (OUTPATIENT)
Dept: CARDIOLOGY | Facility: CLINIC | Age: 59
End: 2024-08-19
Payer: MEDICARE

## 2024-08-19 NOTE — TELEPHONE ENCOUNTER
Writer spoke to pt, pt requested Pain medication refill. Per Dr. Bonilla pt was informed he could take 800mg of ibuprofen three times a day as needed. Pt encouraged to eat regularly and drink lots of water. Pt complained of abdomen pain. Pt was offered a follow up with Dr. Bonilla for the following day 08/20/24. Pt declined appt, pt informed to let us know if he wanted to be seen sooner than his post op appt or if he needed anything else.

## 2024-08-23 ENCOUNTER — OFFICE VISIT (OUTPATIENT)
Dept: SURGERY | Facility: CLINIC | Age: 59
End: 2024-08-23
Payer: MEDICARE

## 2024-08-23 VITALS — WEIGHT: 174 LBS | BODY MASS INDEX: 25.77 KG/M2 | HEIGHT: 69 IN

## 2024-08-23 DIAGNOSIS — K42.9 UMBILICAL HERNIA WITHOUT OBSTRUCTION AND WITHOUT GANGRENE: Primary | ICD-10-CM

## 2024-08-23 PROCEDURE — 99999 PR PBB SHADOW E&M-EST. PATIENT-LVL III: CPT | Mod: PBBFAC,,, | Performed by: SPECIALIST

## 2024-08-23 PROCEDURE — 99213 OFFICE O/P EST LOW 20 MIN: CPT | Mod: PBBFAC | Performed by: SPECIALIST

## 2024-08-23 RX ORDER — CETIRIZINE HYDROCHLORIDE 10 MG/1
10 TABLET, FILM COATED ORAL
COMMUNITY
Start: 2024-08-15

## 2024-08-23 NOTE — PROGRESS NOTES
"Patient is a 58 y.o. male who presents for postoperative evaluation following  laparoscopic umbilical hernia repair    Chief Complaint   Patient presents with    Post-op Evaluation     HR 8/14    Suture / Staple Removal        Vitals:    08/23/24 1044   Weight: 78.9 kg (174 lb)   Height: 5' 9" (1.753 m)        Subjective      Physical Exam     Incision clean dry and intact    Drains none    Pathology none    Assessment & Plan     Doing well status post laparoscopic repair umbilical hernia  Recommendations remove staples.  Refrain from lifting for another week or 2.  May ride a bike in another week or 2    No orders of the defined types were placed in this encounter.       Follow-up p.katlyn.    Roel Bonilla MD  General Surgery  [address]   [place] [address]      Patient instructed that best way to communicate with my office staff is for patient to get on the [facility] epic patient portal to expedite communication and communication issues that may occur.  Patient was given instructions on how to get on the portal.  I encouraged patient to obtain portal access as well.  Ultimately it is up to the patient to obtain access.  Patient voiced understanding.    "

## 2024-09-04 ENCOUNTER — OFFICE VISIT (OUTPATIENT)
Dept: FAMILY MEDICINE | Facility: CLINIC | Age: 59
End: 2024-09-04
Payer: MEDICARE

## 2024-09-04 VITALS
BODY MASS INDEX: 25.33 KG/M2 | HEIGHT: 69 IN | DIASTOLIC BLOOD PRESSURE: 84 MMHG | HEART RATE: 86 BPM | WEIGHT: 171 LBS | SYSTOLIC BLOOD PRESSURE: 122 MMHG

## 2024-09-04 DIAGNOSIS — B96.89 BACTERIAL SINUSITIS: Primary | ICD-10-CM

## 2024-09-04 DIAGNOSIS — J32.9 BACTERIAL SINUSITIS: Primary | ICD-10-CM

## 2024-09-04 PROCEDURE — 99214 OFFICE O/P EST MOD 30 MIN: CPT | Mod: S$GLB,,, | Performed by: STUDENT IN AN ORGANIZED HEALTH CARE EDUCATION/TRAINING PROGRAM

## 2024-09-04 PROCEDURE — G2211 COMPLEX E/M VISIT ADD ON: HCPCS | Mod: S$GLB,,, | Performed by: STUDENT IN AN ORGANIZED HEALTH CARE EDUCATION/TRAINING PROGRAM

## 2024-09-04 RX ORDER — AZITHROMYCIN 250 MG/1
TABLET, FILM COATED ORAL
Qty: 6 TABLET | Refills: 0 | Status: SHIPPED | OUTPATIENT
Start: 2024-09-04 | End: 2024-09-09

## 2024-09-04 RX ORDER — GUAIFENESIN AND DEXTROMETHORPHAN HYDROBROMIDE 600; 30 MG/1; MG/1
1 TABLET, EXTENDED RELEASE ORAL 2 TIMES DAILY
Qty: 14 TABLET | Refills: 0 | Status: SHIPPED | OUTPATIENT
Start: 2024-09-04 | End: 2024-09-11

## 2024-09-04 NOTE — PROGRESS NOTES
"  [facility]  [address], [address]  [place] [address]    Subjective     Patient ID: Vimal Cruz is a 58 y.o. male who comes to the clinic for an acute visit.    Chief Complaint: Follow-up and Sinus Problem    Sinusitis - has been going on since he was intubated 3 weeks ago for his umbilical hernia surgery. He's doing well in that regard but is having sinus issues, using flonase everyday and azelastine    ROS negative unless stated above       Objective     Vitals:    09/04/24 1552   BP: 122/84   Pulse: 86   Weight: 77.6 kg (171 lb)   Height: 5' 9" (1.753 m)       Wt Readings from Last 3 Encounters:   09/04/24 1552 77.6 kg (171 lb)   08/23/24 1044 78.9 kg (174 lb)   08/14/24 0653 78 kg (172 lb)        Physical Exam  Vitals reviewed.   Constitutional:       Appearance: Normal appearance.   HENT:      Head: Normocephalic and atraumatic.   Eyes:      Extraocular Movements: Extraocular movements intact.      Pupils: Pupils are equal, round, and reactive to light.   Cardiovascular:      Rate and Rhythm: Normal rate.   Pulmonary:      Effort: Pulmonary effort is normal. No respiratory distress.   Musculoskeletal:         General: Normal range of motion.      Cervical back: Normal range of motion and neck supple.   Skin:     General: Skin is dry.      Capillary Refill: Capillary refill takes less than 2 seconds.   Neurological:      General: No focal deficit present.      Mental Status: He is alert and oriented to person, place, and time. Mental status is at baseline.   Psychiatric:         Mood and Affect: Mood normal.         Behavior: Behavior normal.         Thought Content: Thought content normal.         Current Outpatient Medications   Medication Instructions    albuterol (VENTOLIN HFA) 90 mcg/actuation inhaler 2 puffs, Inhalation, Every 6 hours PRN, Rescue    ALLERGY RELIEF (CETIRIZINE) 10 mg, Oral    azelastine (ASTELIN) 137 mcg (0.1 %) nasal " spray 2 sprays, Nasal, Nightly PRN    azithromycin (Z-SHAE) 250 MG tablet Take 2 tablets by mouth on day 1; Take 1 tablet by mouth on days 2-5<BR>    busPIRone (BUSPAR) 7.5 mg, Oral, 3 times daily PRN    dextromethorphan-guaiFENesin  mg (MUCINEX DM)  mg per 12 hr tablet 1 tablet, Oral, 2 times daily    diltiaZEM (CARDIZEM CD) 120 mg, Oral, Nightly    fluticasone propionate (FLONASE) 50 mcg, Each Nostril, Daily    fluticasone-umeclidin-vilanter (TRELEGY ELLIPTA) 200-62.5-25 mcg inhaler 1 puff, Inhalation, Daily    ramipriL (ALTACE) 2.5 mg, Daily    rosuvastatin (CRESTOR) 5 mg, Oral, Daily    tadalafiL (CIALIS) 20 mg, Oral, Daily           Assessment and Plan     1. Bacterial sinusitis  -     azithromycin (Z-SHAE) 250 MG tablet; Take 2 tablets by mouth on day 1; Take 1 tablet by mouth on days 2-5  Dispense: 6 tablet; Refill: 0  -     dextromethorphan-guaiFENesin  mg (MUCINEX DM)  mg per 12 hr tablet; Take 1 tablet by mouth 2 (two) times daily. for 7 days  Dispense: 14 tablet; Refill: 0        Here for an acute visit    Treating likely bacterial infection w/ zpack and mucinex DM    Encouraged patient to try OTC medicines such as cough drops, mucinex, flonase and remedies such as honey w/ tea, nasal saline rinses, humidified air, Toro's vaporub. These symptoms can last up to 6-8 weeks. Reassured them that this will pass w/ time.     Continue other chronic meds as above    Visit today included increased complexity associated with the care of the episodic problem COPD addressed and managing the longitudinal care of the patient due to the serious and/or complex managed problem(s)    RTC in 6 months         I encouraged the patient to take all medications as prescribed and to keep follow up appointments with their providers. Patient stated they had no other concerns. Questions were invited and answered. Follow up sooner if need. ED precautions given.    Myron Duarte MD  09/04/2024 3:57 PM

## 2024-10-14 ENCOUNTER — PATIENT MESSAGE (OUTPATIENT)
Dept: FAMILY MEDICINE | Facility: CLINIC | Age: 59
End: 2024-10-14
Payer: MEDICARE

## 2024-10-16 ENCOUNTER — TELEPHONE (OUTPATIENT)
Dept: CARDIOLOGY | Facility: CLINIC | Age: 59
End: 2024-10-16
Payer: MEDICARE

## 2024-10-16 NOTE — TELEPHONE ENCOUNTER
Change in cholesterol medication, hasn't had labs done. Advised patient to have lipid panel done as ordered. Pt verbalized understanding of instructions

## 2024-10-16 NOTE — TELEPHONE ENCOUNTER
----- Message from Geeta sent at 10/16/2024 10:21 AM CDT -----  Contact: pt  Type: Needs Medical Advice         Who Called:pt  Best Call Back Number:[phone]  Additional Information: Requesting a call back regarding pt is asking due to the change in his Rx is he needing any labs.   Please Advise- Thank you

## 2024-10-22 ENCOUNTER — OFFICE VISIT (OUTPATIENT)
Dept: PULMONOLOGY | Facility: CLINIC | Age: 59
End: 2024-10-22
Payer: MEDICARE

## 2024-10-22 VITALS
SYSTOLIC BLOOD PRESSURE: 168 MMHG | HEIGHT: 69 IN | OXYGEN SATURATION: 97 % | WEIGHT: 175.94 LBS | BODY MASS INDEX: 26.06 KG/M2 | DIASTOLIC BLOOD PRESSURE: 97 MMHG | HEART RATE: 71 BPM

## 2024-10-22 DIAGNOSIS — J44.9 CHRONIC OBSTRUCTIVE PULMONARY DISEASE, UNSPECIFIED COPD TYPE: ICD-10-CM

## 2024-10-22 DIAGNOSIS — J44.89 ASTHMA-COPD OVERLAP SYNDROME: ICD-10-CM

## 2024-10-22 DIAGNOSIS — R09.89 CHRONIC SINUS COMPLAINTS: ICD-10-CM

## 2024-10-22 DIAGNOSIS — J45.909 STEROID-DEPENDENT ASTHMA, UNSPECIFIED ASTHMA SEVERITY, UNSPECIFIED WHETHER COMPLICATED, UNSPECIFIED WHETHER PERSISTENT: ICD-10-CM

## 2024-10-22 DIAGNOSIS — J45.50 SEVERE PERSISTENT ASTHMA, UNCOMPLICATED: Primary | ICD-10-CM

## 2024-10-22 DIAGNOSIS — F41.9 ANXIETY: ICD-10-CM

## 2024-10-22 DIAGNOSIS — F17.211 CIGARETTE NICOTINE DEPENDENCE IN REMISSION: ICD-10-CM

## 2024-10-22 PROCEDURE — 99215 OFFICE O/P EST HI 40 MIN: CPT | Mod: S$PBB,,, | Performed by: INTERNAL MEDICINE

## 2024-10-22 PROCEDURE — 99999 PR PBB SHADOW E&M-EST. PATIENT-LVL IV: CPT | Mod: PBBFAC,,, | Performed by: INTERNAL MEDICINE

## 2024-10-22 PROCEDURE — 99214 OFFICE O/P EST MOD 30 MIN: CPT | Mod: PBBFAC,PO | Performed by: INTERNAL MEDICINE

## 2024-10-22 PROCEDURE — 99417 PROLNG OP E/M EACH 15 MIN: CPT | Mod: S$PBB,,, | Performed by: INTERNAL MEDICINE

## 2024-10-22 RX ORDER — DUPILUMAB 300 MG/2ML
600 INJECTION, SOLUTION SUBCUTANEOUS
Qty: 4 ML | Refills: 0 | Status: ACTIVE | OUTPATIENT
Start: 2024-10-22 | End: 2024-10-24 | Stop reason: DRUGHIGH

## 2024-10-22 RX ORDER — FLUTICASONE FUROATE AND VILANTEROL 100; 25 UG/1; UG/1
1 POWDER RESPIRATORY (INHALATION) DAILY
Qty: 60 EACH | Refills: 11 | Status: SHIPPED | OUTPATIENT
Start: 2024-10-22

## 2024-10-22 RX ORDER — ENSIFENTRINE 3 MG/2.5ML
3 SUSPENSION RESPIRATORY (INHALATION) 2 TIMES DAILY
Qty: 150 ML | Refills: 11 | Status: SHIPPED | OUTPATIENT
Start: 2024-10-22

## 2024-10-22 RX ORDER — DUPILUMAB 300 MG/2ML
300 INJECTION, SOLUTION SUBCUTANEOUS
Qty: 4 ML | Refills: 11 | Status: ACTIVE | OUTPATIENT
Start: 2024-11-05

## 2024-10-22 RX ORDER — ALBUTEROL SULFATE 90 UG/1
2 INHALANT RESPIRATORY (INHALATION) EVERY 4 HOURS PRN
Qty: 18 G | Refills: 11 | Status: SHIPPED | OUTPATIENT
Start: 2024-10-22

## 2024-10-22 RX ORDER — PREDNISONE 20 MG/1
TABLET ORAL
Qty: 21 TABLET | Refills: 2 | Status: SHIPPED | OUTPATIENT
Start: 2024-10-22

## 2024-10-22 RX ORDER — FLUTICASONE FUROATE AND VILANTEROL 100; 25 UG/1; UG/1
POWDER RESPIRATORY (INHALATION)
COMMUNITY
Start: 2023-11-14 | End: 2024-10-22 | Stop reason: SDUPTHER

## 2024-10-22 RX ORDER — ALBUTEROL SULFATE 0.83 MG/ML
2.5 SOLUTION RESPIRATORY (INHALATION) EVERY 6 HOURS PRN
Qty: 120 EACH | Refills: 11 | Status: SHIPPED | OUTPATIENT
Start: 2024-10-22 | End: 2025-10-22

## 2024-10-22 RX ORDER — FLUTICASONE FUROATE, UMECLIDINIUM BROMIDE AND VILANTEROL TRIFENATATE 100; 62.5; 25 UG/1; UG/1; UG/1
1 POWDER RESPIRATORY (INHALATION) DAILY
Qty: 1 EACH | Refills: 11 | Status: SHIPPED | OUTPATIENT
Start: 2024-10-22

## 2024-10-22 RX ORDER — TIOTROPIUM BROMIDE INHALATION SPRAY 3.12 UG/1
2 SPRAY, METERED RESPIRATORY (INHALATION) DAILY
Qty: 4 G | Refills: 11 | Status: SHIPPED | OUTPATIENT
Start: 2024-10-22

## 2024-10-22 RX ORDER — AZITHROMYCIN 500 MG/1
TABLET, FILM COATED ORAL
Qty: 3 TABLET | Refills: 3 | Status: SHIPPED | OUTPATIENT
Start: 2024-10-22

## 2024-10-22 NOTE — PROGRESS NOTES
"10/22/2024    Vimal Cruz  New Patient Consult    Chief Complaint   Patient presents with    Follow-up    COPD       HPI:    10/22/0204 pt worked [employer] [place], [employer].  Off smokes 5 yrs  Diagnosed  around covid.  Pt had had chr sinus with abx 2/yr.  Pt has had childhood wheezes and variable short breath since    Pt was considered for valves -- offered by one Doc, but ended up  having stem cell trial via [place].      Pt uses prednisone -- could take regular to improve breathing    Trelegy ppt hyperness       Pt having eye sight difficulties and cervical problems-- pt feels eye problems from inhaler.  Eye doc relates    Pt wallks regular 20-25 min nearly daily.    Pt has needed  ativan, used ativan and elavil as child-- mom  had anxiety and mother  from stroke after stopping zyprexia.   Pt was using ativan til mom's death in .         PFSH:  Past Medical History:   Diagnosis Date    Anxiety     Arthritis     "degenerative bone disease"    COPD (chronic obstructive pulmonary disease)     Emphysema of lung     Heart attack 2017    Hypertension     Medication intolerance, atorvastatin 20 mg with muscle 2024         Past Surgical History:   Procedure Laterality Date    ANTERIOR SPINAL FUSION  2016    c5    HERNIA REPAIR      LAPAROSCOPIC REPAIR OF UMBILICAL HERNIA N/A 2024    Procedure: REPAIR, HERNIA, UMBILICAL, LAPAROSCOPIC;  Surgeon: Roel Bonilla MD;  Location: [facility];  Service: General;  Laterality: N/A;     Social History     Tobacco Use    Smoking status: Former     Average packs/day: 1 pack/day for 41.3 years (41.3 ttl pk-yrs)     Types: Cigarettes     Start date: 1978    Smokeless tobacco: Never   Substance Use Topics    Alcohol use: Not Currently     Comment: socially    Drug use: Never     Family History   Problem Relation Name Age of Onset    Dementia Mother      Stroke Mother  77        after stopping zyprexa    Emphysema Maternal Grandmother  56    Throat " "cancer Maternal Grandfather      Cancer Maternal Grandfather  87        colon    Dementia Paternal Grandmother      Stroke Paternal Grandfather       Review of patient's allergies indicates:   Allergen Reactions    Amoxicillin Itching     Only if not taken with food    Penicillin Itching     Only if taken without food          Review of Systems:  a review of eleven systems covering constitutional, Eye, HEENT, Psych, Respiratory, Cardiac, GI, , Musculoskeletal, Endocrine, Dermatologic was negative except for pertinent findings as listed ABOVE and below:  pertinent positives as above, rest good        Exam:Comprehensive exam done. BP (!) 168/97 (BP Location: Left arm, Patient Position: Sitting)   Pulse 71   Ht 5' 9" (1.753 m)   Wt 79.8 kg (175 lb 14.8 oz)   SpO2 97% Comment: on room air at rest  BMI 25.98 kg/m²   Exam included Vitals as listed, and patient's appearance and affect and alertness and mood, oral exam for yeast and hygiene and pharynx lesions and Mallapatti (M) score, neck with inspection for jvd and masses and thyroid abnormalities and lymph nodes (supraclavicular and infraclavicular nodes and axillary also examined and noted if abn), chest exam included symmetry and effort and fremitus and percussion and auscultation, cardiac exam included rhythm and gallops and murmur and rubs and jvd and edema, abdominal exam for mass and hepatosplenomegaly and tenderness and hernias and bowel sounds, Musculoskeletal exam with muscle tone and posture and mobility/gait and  strength, and skin for rashes and cyanosis and pallor and turgor, extremity for clubbing.  Findings were normal except for pertinent findings listed below:  Anxious, chest is symmetric, no distress, normal percussion, normal fremitus and good normal breath sounds  No edema          Radiographs (ct chest and cxr) reviewed: view by direct vision      Labs reviewed           PFT results reviewed     Spirometry with bronchodilator, lung volume " "by body box, diffusion capacity measured May 9, 2024. There is severe  airflow obstruction with an FEV1 of 1.46 L or 42% of predicted. The FEV1 improves by 11% following bronchodilator with  12% needed for statistical significance. Total lung capacity was high at 129% predicted with a residual volume of 211% of  predicted -there is severe hyperinflation. Diffusion was only 38% of predicted.  There is severe hyperinflation and airflow obstruction. Bronchodilator response was not statistically significant at 11%.  Clinical correlation recommended. There was no restriction.  (Physician Final: 05/09/2024 06:09PM, Electronically signed by Dr. Kei Nguyen)  Plan:  Clinical impression is apparently straight forward and impression with management as below.    Vimal Sim" was seen today for follow-up and copd.    Diagnoses and all orders for this visit:    Asthma-COPD overlap syndrome  -     ensifentrine (OHTUVAYRE) 3 mg/2.5 mL NbSp; Inhale 3 mg into the lungs 2 (two) times daily.  -     albuterol (VENTOLIN HFA) 90 mcg/actuation inhaler; Inhale 2 puffs into the lungs every 4 (four) hours as needed for Shortness of Breath or Wheezing. Rescue  -     fluticasone-umeclidin-vilanter (TRELEGY ELLIPTA) 100-62.5-25 mcg DsDv; Inhale 1 puff into the lungs once daily.  -     tiotropium bromide (SPIRIVA RESPIMAT) 2.5 mcg/actuation inhaler; Inhale 2 puffs into the lungs Daily. Controller  -     albuterol (PROVENTIL) 2.5 mg /3 mL (0.083 %) nebulizer solution; Take 3 mLs (2.5 mg total) by nebulization every 6 (six) hours as needed.  -     NEBULIZER FOR HOME USE    Severe persistent asthma, uncomplicated  -     albuterol (VENTOLIN HFA) 90 mcg/actuation inhaler; Inhale 2 puffs into the lungs every 4 (four) hours as needed for Shortness of Breath or Wheezing. Rescue  -     fluticasone-umeclidin-vilanter (TRELEGY ELLIPTA) 100-62.5-25 mcg DsDv; Inhale 1 puff into the lungs once daily.  -     tiotropium bromide (SPIRIVA RESPIMAT) 2.5 " mcg/actuation inhaler; Inhale 2 puffs into the lungs Daily. Controller  -     dupilumab (DUPIXENT PEN) 300 mg/2 mL PnIj; Inject 2 mLs (300 mg total) into the skin every 14 (fourteen) days.  -     albuterol (PROVENTIL) 2.5 mg /3 mL (0.083 %) nebulizer solution; Take 3 mLs (2.5 mg total) by nebulization every 6 (six) hours as needed.  -     NEBULIZER FOR HOME USE  -     dupilumab (DUPIXENT PEN) 300 mg/2 mL PnIj; Inject 4 mLs (600 mg total) into the skin every 14 (fourteen) days.  -     fluticasone furoate-vilanteroL (BREO ELLIPTA) 100-25 mcg/dose diskus inhaler; Inhale 1 puff into the lungs once daily. Controller    Steroid-dependent asthma, unspecified asthma severity, unspecified whether complicated, unspecified whether persistent  -     predniSONE (DELTASONE) 20 MG tablet; Take one daily for 3 days and may repeat for shortness of breath.  -     albuterol (VENTOLIN HFA) 90 mcg/actuation inhaler; Inhale 2 puffs into the lungs every 4 (four) hours as needed for Shortness of Breath or Wheezing. Rescue  -     fluticasone-umeclidin-vilanter (TRELEGY ELLIPTA) 100-62.5-25 mcg DsDv; Inhale 1 puff into the lungs once daily.  -     dupilumab (DUPIXENT PEN) 300 mg/2 mL PnIj; Inject 2 mLs (300 mg total) into the skin every 14 (fourteen) days.  -     albuterol (PROVENTIL) 2.5 mg /3 mL (0.083 %) nebulizer solution; Take 3 mLs (2.5 mg total) by nebulization every 6 (six) hours as needed.  -     NEBULIZER FOR HOME USE  -     dupilumab (DUPIXENT PEN) 300 mg/2 mL PnIj; Inject 4 mLs (600 mg total) into the skin every 14 (fourteen) days.    Chronic sinus complaints  -     azithromycin (ZITHROMAX) 500 MG tablet; One daily for yellow mucous, repeat if needed    Cigarette nicotine dependence in remission  -     CT Chest Lung Screening Low Dose; Standing    Anxiety    Chronic obstructive pulmonary disease, unspecified COPD type  -     ensifentrine (OHTUVAYRE) 3 mg/2.5 mL NbSp; Inhale 3 mg into the lungs 2 (two) times daily.        Follow  up in about 8 weeks (around 12/17/2024), or if symptoms worsen or fail to improve, for Virtual Visit.    Discussed with patient above for education the following:      Patient Instructions     Ct chest viewed from March 2024.  You should have yearly ct chest for screen.    You have focal emphysema disease and valves should help.  You have asthma copd overlap.    You have asthma -- breathing is  steroid dependant and improves dramatically with steroids.  You have been on controller with breo all year.  You have severe steroid dependant asthma-- you have used prednisone 3 times this year.    Would recommend dupixent for steroid dependant asthma.    Would do bone density in future if uses prednisone more regular.  Also do screens for diabetes..    Would recommend using anticholinergic -- go from breo to trelegy (100 or 200) daily or add Spiriva 2 puffs daily to breo.......    Lung capacity is at least 40% range..    not fatal range.  You are physically and psychologically debilitated    New medication- ohtuvarye- worth a trial if covered-- will order    Use prednisone 20 mg daily for up to 3 days, repeat as  needed.    May use azithromycin for sinus infections.    Anxiety treatment may help?????    Asthma therapy  Use controller daily -- breo or trelegy  Rescue -- use 2 - 4 puff or nebulizer every 4 hrs  Action  plan prednisone as needed--     Evaluation took 85 minutes.

## 2024-10-22 NOTE — PATIENT INSTRUCTIONS
Ct chest viewed from March 2024.  You should have yearly ct chest for screen.    You have focal emphysema disease and valves should help.  You have asthma copd overlap.    You have asthma -- breathing is  steroid dependant and improves dramatically with steroids.  You have been on controller with breo all year.  You have severe steroid dependant asthma-- you have used prednisone 3 times this year.    Would recommend dupixent for steroid dependant asthma.    Would do bone density in future if uses prednisone more regular.  Also do screens for diabetes..    Would recommend using anticholinergic -- go from breo to trelegy (100 or 200) daily or add Spiriva 2 puffs daily to breo.......    Lung capacity is at least 40% range..    not fatal range.  You are physically and psychologically debilitated    New medication- ohtuvarye- worth a trial if covered-- will order    Use prednisone 20 mg daily for up to 3 days, repeat as  needed.    May use azithromycin for sinus infections.    Anxiety treatment may help?????    Asthma therapy  Use controller daily -- breo or trelegy  Rescue -- use 2 - 4 puff or nebulizer every 4 hrs  Action  plan prednisone as needed--

## 2024-11-08 ENCOUNTER — TELEPHONE (OUTPATIENT)
Dept: PULMONOLOGY | Facility: CLINIC | Age: 59
End: 2024-11-08
Payer: MEDICARE

## 2024-11-18 ENCOUNTER — LAB VISIT (OUTPATIENT)
Dept: LAB | Facility: HOSPITAL | Age: 59
End: 2024-11-18
Attending: INTERNAL MEDICINE
Payer: MEDICARE

## 2024-11-18 ENCOUNTER — PATIENT MESSAGE (OUTPATIENT)
Dept: CARDIOLOGY | Facility: CLINIC | Age: 59
End: 2024-11-18
Payer: MEDICARE

## 2024-11-18 DIAGNOSIS — E78.2 MIXED HYPERLIPIDEMIA: ICD-10-CM

## 2024-11-18 DIAGNOSIS — I25.2 HISTORY OF NON-ST ELEVATION MYOCARDIAL INFARCTION (NSTEMI): ICD-10-CM

## 2024-11-18 LAB
CHOLEST SERPL-MCNC: 180 MG/DL (ref 120–199)
CHOLEST/HDLC SERPL: 3 {RATIO} (ref 2–5)
CRP SERPL-MCNC: 1.2 MG/L (ref 0–3.19)
HDLC SERPL-MCNC: 60 MG/DL (ref 40–75)
HDLC SERPL: 33.3 % (ref 20–50)
LDLC SERPL CALC-MCNC: 92 MG/DL (ref 63–159)
NONHDLC SERPL-MCNC: 120 MG/DL
TRIGL SERPL-MCNC: 140 MG/DL (ref 30–150)

## 2024-11-18 PROCEDURE — 80061 LIPID PANEL: CPT | Performed by: INTERNAL MEDICINE

## 2024-11-18 PROCEDURE — 36415 COLL VENOUS BLD VENIPUNCTURE: CPT | Performed by: INTERNAL MEDICINE

## 2024-11-18 PROCEDURE — 86141 C-REACTIVE PROTEIN HS: CPT | Performed by: INTERNAL MEDICINE

## 2024-11-21 ENCOUNTER — TELEPHONE (OUTPATIENT)
Dept: PULMONOLOGY | Facility: CLINIC | Age: 59
End: 2024-11-21
Payer: MEDICARE

## 2024-11-21 NOTE — TELEPHONE ENCOUNTER
----- Message from Cara sent at 11/21/2024  9:29 AM CST -----  Contact: SELF  [phone]  Type:  Needs Medical Advice    Who Called: KIAH  Symptoms (please be specific):   How long has patient had these symptoms:   Pharmacy name and phone #:   Would the patient rather a call back or a response via [facility]?  Best Call Back Number:  [phone]  Additional Information: Patient called in this morning wanting to know can he get scheduled for an appointment to start on dupilumab (DUPIXENT PEN) 300 mg/2 mL PnIj. He states it has been over a month and now he has to start the process all over again. He is very frustrated. Please call back dupilumab (DUPIXENT PEN) 300 mg/2 mL PnIj. Thanks tpw

## 2024-11-21 NOTE — TELEPHONE ENCOUNTER
Returned pt call, spoke about pt not receiving Dupixent from pharmacy, [facility] told pt to come back to clinic for newstart while they re-process his enrollment for PAP. Scheduled Nurse visit appt 11/25/24 @ 0240 per pt request. DAP will be provided at visit. Pt verbalized understanding.

## 2024-11-25 ENCOUNTER — CLINICAL SUPPORT (OUTPATIENT)
Dept: PULMONOLOGY | Facility: CLINIC | Age: 59
End: 2024-11-25
Payer: MEDICARE

## 2024-11-25 ENCOUNTER — TELEPHONE (OUTPATIENT)
Dept: PULMONOLOGY | Facility: CLINIC | Age: 59
End: 2024-11-25

## 2024-11-25 DIAGNOSIS — J44.89 ASTHMA-COPD OVERLAP SYNDROME: Primary | ICD-10-CM

## 2024-12-02 NOTE — PROGRESS NOTES
Patient is here for his Uploading dose of Dupixent 2 injections of 300 mg/2mL R/T asthma.  2 patient identifiers used (Name and )  Injections given into the right and left abdomen.  No redness, swelling, bleeding, or reaction noted to injection site.  Pt tolerated well. Appt scheduled in 2 weeks.    NDC: 1364-2851-68  LOT#: [id number]  Expiration: 2026     4 3

## 2024-12-03 ENCOUNTER — TELEPHONE (OUTPATIENT)
Dept: PULMONOLOGY | Facility: CLINIC | Age: 59
End: 2024-12-03
Payer: MEDICARE

## 2024-12-03 NOTE — TELEPHONE ENCOUNTER
Spoke to patient.  I informed him that his enrollment form for Dupixent was refaxed again with the box checked off for [facility].  Pt verbalized understanding.

## 2024-12-03 NOTE — TELEPHONE ENCOUNTER
----- Message from Burak sent at 12/3/2024 10:19 AM CST -----  Consult/Advisory    Name Of Caller:Vimal       Contact Preference:[phone]    Nature of call: Pt called stating he needs to speak to someone about his application he stated he needs another application without the box sent he is asking for a call so he can explain

## 2024-12-09 RX ORDER — RAMIPRIL 2.5 MG/1
2.5 CAPSULE ORAL DAILY
Qty: 90 CAPSULE | Refills: 1 | Status: SHIPPED | OUTPATIENT
Start: 2024-12-09

## 2024-12-09 RX ORDER — DILTIAZEM HYDROCHLORIDE 120 MG/1
120 CAPSULE, COATED, EXTENDED RELEASE ORAL NIGHTLY
Qty: 90 CAPSULE | Refills: 1 | Status: SHIPPED | OUTPATIENT
Start: 2024-12-09

## 2024-12-09 NOTE — TELEPHONE ENCOUNTER
No care due was identified.  [facility] Embedded Care Due Messages. Reference number: [id number].   12/09/2024 11:02:36 AM CST

## 2024-12-09 NOTE — TELEPHONE ENCOUNTER
----- Message from Mindi sent at 12/9/2024 10:54 AM CST -----  Contact: Patient  Type:  Needs Medical Advice    Who Called: Patient      Pharmacy name and phone #:    [facility] - [place], [place] - [id number] [address]  Phone: [phone] Fax: [phone]      Would the patient rather a call back or a response via [facility]? Call    Best Call Back Number: [phone]    Additional Information: Patient states that he has been trying to get his meds refilled for two weeks now. Please advise     ramipriL (ALTACE) 2.5 MG capsule    diltiaZEM (CARDIZEM CD) 120 MG Cp24

## 2024-12-09 NOTE — TELEPHONE ENCOUNTER
Refill Routing Note   Medication(s) are not appropriate for processing by [facility] for the following reason(s):        Required vitals abnormal  No active prescription written by provider    [facility] action(s):  Defer               Appointments  past 12m or future 3m with PCP    Date Provider   Last Visit   9/4/2024 Myron Duarte MD   Next Visit   Visit date not found Myron Duarte MD   ED visits in past 90 days: 0        Note composed:11:03 AM 12/09/2024

## 2024-12-18 ENCOUNTER — OFFICE VISIT (OUTPATIENT)
Dept: PULMONOLOGY | Facility: CLINIC | Age: 59
End: 2024-12-18
Payer: MEDICARE

## 2024-12-18 VITALS — BODY MASS INDEX: 26.06 KG/M2 | HEIGHT: 69 IN | WEIGHT: 175.94 LBS

## 2024-12-18 DIAGNOSIS — J45.50 SEVERE PERSISTENT ASTHMA, UNCOMPLICATED: Primary | ICD-10-CM

## 2024-12-18 DIAGNOSIS — J44.89 ASTHMA-COPD OVERLAP SYNDROME: ICD-10-CM

## 2024-12-18 RX ORDER — FLUTICASONE FUROATE, UMECLIDINIUM BROMIDE AND VILANTEROL TRIFENATATE 200; 62.5; 25 UG/1; UG/1; UG/1
1 POWDER RESPIRATORY (INHALATION)
COMMUNITY
Start: 2024-11-18 | End: 2024-12-18

## 2024-12-18 NOTE — PATIENT INSTRUCTIONS
Dupixent may be working     Stay on preventive inhaler -- breo or trelegy    Follow up 4- 6 months    Would recommend exercise program

## 2024-12-18 NOTE — PROGRESS NOTES
The patient location is: [facility]  The chief complaint leading to consultation is: sob     Visit type: audiovisual    Face to Face time with patient:  19  21 minutes of total time spent on the encounter, which includes face to face time and non-face to face time preparing to see the patient (eg, review of tests), Obtaining and/or reviewing separately obtained history, Documenting clinical information in the electronic or other health record, Independently interpreting results (not separately reported) and communicating results to the patient/family/caregiver, or Care coordination (not separately reported).         Each patient to whom he or she provides medical services by telemedicine is:  (1) informed of the relationship between the physician and patient and the respective role of any other health care provider with respect to management of the patient; and (2) notified that he or she may decline to receive medical services by telemedicine and may withdraw from such care at any time.    Notes:      12/18/2024    Vimal Cruz  New Patient Consult    Chief Complaint   Patient presents with    Follow-up    COPD       HPI:    12/18/2024 pt feels dupixent doing well. Uses trelegy daily.  Very concerned re use inhaled steroids.     Feels trelegy may be ppt back pain =- may wish to stay on breo.  Dupixent said to decrease hand arthritic c/o?    Avoided ohtuvyare with suicide side effect  mentioned..      10/22/0204 pt worked [email or web address]  Off smokes 5 yrs  Diagnosed 2020 around covid.  Pt had had chr sinus with abx 2/yr.  Pt has had childhood wheezes and variable short breath since    Pt was considered for valves -- offered by one Doc, but ended up  having stem cell trial via [place].      Pt uses prednisone -- could take regular to improve breathing    Trelegy ppt hyperness       Pt having eye sight difficulties and cervical problems-- pt feels eye problems from inhaler.  Eye doc  "relates    Pt wallks regular 20-25 min nearly daily.    Pt has needed  ativan, used ativan and elavil as child-- mom  had anxiety and mother  from stroke after stopping zyprexia.   Pt was using ativan til mom's death in .  Patient Instructions     Ct chest viewed from 2024.  You should have yearly ct chest for screen.    You have focal emphysema disease and valves should help.  You have asthma copd overlap.    You have asthma -- breathing is  steroid dependant and improves dramatically with steroids.  You have been on controller with breo all year.  You have severe steroid dependant asthma-- you have used prednisone 3 times this year.    Would recommend dupixent for steroid dependant asthma.    Would do bone density in future if uses prednisone more regular.  Also do screens for diabetes..    Would recommend using anticholinergic -- go from breo to trelegy (100 or 200) daily or add Spiriva 2 puffs daily to breo.......    Lung capacity is at least 40% range..    not fatal range.  You are physically and psychologically debilitated    New medication- ohtuvarye- worth a trial if covered-- will order    Use prednisone 20 mg daily for up to 3 days, repeat as  needed.    May use azithromycin for sinus infections.    Anxiety treatment may help?????    Asthma therapy  Use controller daily -- breo or trelegy  Rescue -- use 2 - 4 puff or nebulizer every 4 hrs  Action  plan prednisone as needed--        PFSH:  Past Medical History:   Diagnosis Date    Anxiety     Arthritis     "degenerative bone disease"    COPD (chronic obstructive pulmonary disease)     Emphysema of lung     Heart attack 2017    Hypertension     Medication intolerance, atorvastatin 20 mg with muscle 2024         Past Surgical History:   Procedure Laterality Date    ANTERIOR SPINAL FUSION  2016    c5    HERNIA REPAIR      LAPAROSCOPIC REPAIR OF UMBILICAL HERNIA N/A 2024    Procedure: REPAIR, HERNIA, UMBILICAL, LAPAROSCOPIC;  Surgeon: " "Roel Bonilla MD;  Location: [facility] OR;  Service: General;  Laterality: N/A;     Social History     Tobacco Use    Smoking status: Former     Average packs/day: 1 pack/day for 41.3 years (41.3 ttl pk-yrs)     Types: Cigarettes     Start date: 11/21/1978    Smokeless tobacco: Never   Substance Use Topics    Alcohol use: Not Currently     Comment: socially    Drug use: Never     Family History   Problem Relation Name Age of Onset    Dementia Mother      Stroke Mother  77        after stopping zyprexa    Emphysema Maternal Grandmother  56    Throat cancer Maternal Grandfather      Cancer Maternal Grandfather  87        colon    Dementia Paternal Grandmother      Stroke Paternal Grandfather       Review of patient's allergies indicates:   Allergen Reactions    Amoxicillin Itching     Only if not taken with food    Penicillin Itching     Only if taken without food          Review of Systems:  a review of eleven systems covering constitutional, Eye, HEENT, Psych, Respiratory, Cardiac, GI, , Musculoskeletal, Endocrine, Dermatologic was negative except for pertinent findings as listed ABOVE and below:  pertinent positives as above, rest good        Exam:Comprehensive exam done. Ht 5' 9" (1.753 m)   Wt 79.8 kg (175 lb 14.8 oz)   BMI 25.98 kg/m²   Exam included Vitals as listed, and patient's appearance and affect and alertness and mood, oral exam for yeast and hygiene and pharynx lesions and Mallapatti (M) score, neck with inspection for jvd and masses and thyroid abnormalities and lymph nodes (supraclavicular and infraclavicular nodes and axillary also examined and noted if abn), chest exam included symmetry and effort and fremitus and percussion and auscultation, cardiac exam included rhythm and gallops and murmur and rubs and jvd and edema, abdominal exam for mass and hepatosplenomegaly and tenderness and hernias and bowel sounds, Musculoskeletal exam with muscle tone and posture and mobility/gait and  strength, " "and skin for rashes and cyanosis and pallor and turgor, extremity for clubbing.  Findings were normal except for pertinent findings listed below:  Anxious, chest is symmetric, no distress, normal percussion, normal fremitus and good normal breath sounds  No edema          Radiographs (ct chest and cxr) reviewed: view by direct vision      Labs reviewed           PFT results reviewed     Spirometry with bronchodilator, lung volume by body box, diffusion capacity measured May 9, 2024. There is severe  airflow obstruction with an FEV1 of 1.46 L or 42% of predicted. The FEV1 improves by 11% following bronchodilator with  12% needed for statistical significance. Total lung capacity was high at 129% predicted with a residual volume of 211% of  predicted -there is severe hyperinflation. Diffusion was only 38% of predicted.  There is severe hyperinflation and airflow obstruction. Bronchodilator response was not statistically significant at 11%.  Clinical correlation recommended. There was no restriction.  (Physician Final: 05/09/2024 06:09PM, Electronically signed by Dr. Kei Nguyen)  Plan:  Clinical impression is apparently straight forward and impression with management as below.    Vimal Sim" was seen today for follow-up and copd.    Diagnoses and all orders for this visit:    Severe persistent asthma, uncomplicated    Asthma-COPD overlap syndrome          Follow up in about 4 months (around 4/18/2025), or if symptoms worsen or fail to improve, for Virtual Visit.    Discussed with patient above for education the following:      Patient Instructions   Dupixent may be working     Stay on preventive inhaler -- breo or trelegy    Follow up 4- 6 months    Would recommend exercise program         "

## 2025-01-02 DIAGNOSIS — J45.50 SEVERE PERSISTENT ASTHMA, UNCOMPLICATED: ICD-10-CM

## 2025-01-02 DIAGNOSIS — J44.89 ASTHMA-COPD OVERLAP SYNDROME: ICD-10-CM

## 2025-01-02 DIAGNOSIS — J45.909 STEROID-DEPENDENT ASTHMA, UNSPECIFIED ASTHMA SEVERITY, UNSPECIFIED WHETHER COMPLICATED, UNSPECIFIED WHETHER PERSISTENT: ICD-10-CM

## 2025-01-02 RX ORDER — FLUTICASONE FUROATE AND VILANTEROL TRIFENATATE 100; 25 UG/1; UG/1
1 POWDER RESPIRATORY (INHALATION) DAILY
Qty: 60 EACH | Refills: 11 | Status: SHIPPED | OUTPATIENT
Start: 2025-01-02

## 2025-01-02 RX ORDER — ALBUTEROL SULFATE 90 UG/1
2 INHALANT RESPIRATORY (INHALATION) EVERY 4 HOURS PRN
Qty: 18 G | Refills: 11 | Status: SHIPPED | OUTPATIENT
Start: 2025-01-02

## 2025-01-02 NOTE — TELEPHONE ENCOUNTER
----- Message from Brittney sent at 1/2/2025 12:51 PM CST -----  Name of Who is Calling:KIAH RAINEY [[id number]] ([facility] benefit [employer])        What is the request in detail: Rashmi called on behalf of pt regarding his medication Breo ellipta and albuterol sulfate that the pt is out of.         Can the clinic reply by [facility]:Call        What Number to Call Back if not in [facility]:Telephone Information:  [employer]          [phone]

## 2025-01-02 NOTE — TELEPHONE ENCOUNTER
Patient called in stating he has to change pharmacy's and need Breo(has to brand name) and albuterol to go to [facility]

## 2025-01-03 ENCOUNTER — PATIENT MESSAGE (OUTPATIENT)
Dept: PULMONOLOGY | Facility: CLINIC | Age: 60
End: 2025-01-03
Payer: MEDICARE

## 2025-01-23 DIAGNOSIS — J45.909 STEROID-DEPENDENT ASTHMA, UNSPECIFIED ASTHMA SEVERITY, UNSPECIFIED WHETHER COMPLICATED, UNSPECIFIED WHETHER PERSISTENT: ICD-10-CM

## 2025-01-23 DIAGNOSIS — J45.50 SEVERE PERSISTENT ASTHMA, UNCOMPLICATED: ICD-10-CM

## 2025-01-23 RX ORDER — DUPILUMAB 300 MG/2ML
300 INJECTION, SOLUTION SUBCUTANEOUS
Qty: 4 ML | Refills: 11 | Status: ACTIVE | OUTPATIENT
Start: 2025-01-23

## 2025-01-23 NOTE — TELEPHONE ENCOUNTER
----- Message from Lylette sent at 1/23/2025 11:19 AM CST -----  Regarding: Pt Advice  Contact: [phone], fax [phone]  Bryanna/Derekent calling to request new prescription for affordable medication program. Please call [phone], fax [phone]

## 2025-01-29 ENCOUNTER — TELEPHONE (OUTPATIENT)
Dept: PULMONOLOGY | Facility: CLINIC | Age: 60
End: 2025-01-29
Payer: MEDICARE

## 2025-01-29 NOTE — TELEPHONE ENCOUNTER
Returned pt call spoke about PAP denial, order not sent in and he keeps being told different things. Pt changed his pharmacy to [facility]. Pt explained he did not need anything right now from this nurse. Advised pt to call clinic back if he needed further assistance. Pt verbalized understanding.

## 2025-01-29 NOTE — TELEPHONE ENCOUNTER
----- Message from Sahil sent at 1/29/2025 10:06 AM CST -----  Contact: Self  Type: Needs Medical Advice  Who Called:  Patient    Best Call Back Number: [phone]   Additional Information:  States he would like to cx request for dupilumab (DUPIXENT PEN) 300 mg/2 mL PnIj

## 2025-01-29 NOTE — TELEPHONE ENCOUNTER
----- Message from Cara sent at 1/27/2025  4:22 PM CST -----  Contact: SELF  [phone]  Type:  RX Refill Request    Who Called: Vimal  Refill or New Rx:refill  RX Name and Strength:dupilumab (DUPIXENT PEN) 300 mg/2 mL PnIj  How is the patient currently taking it? (ex. 1XDay)  Is this a 30 day or 90 day RX  Preferred Pharmacy with phone number:[facility]   Local or Mail Order:[facility]  Ordering Provider:  Would the patient rather a call back or a response via [facility]? CALL BACK   Best Call Back Number:[phone]   Additional Information: PT ID [id number]/HAS BEEN ACCEPTED IN THE DUPIXENT PROGRAM.

## 2025-02-14 ENCOUNTER — TELEPHONE (OUTPATIENT)
Dept: PULMONOLOGY | Facility: CLINIC | Age: 60
End: 2025-02-14
Payer: MEDICARE

## 2025-02-14 NOTE — TELEPHONE ENCOUNTER
----- Message from Cara sent at 2/14/2025  3:50 PM CST -----  Contact: self  [phone]  Type:  Needs Medical Advice    Who Called: Vimal  Symptoms (please be specific): pain in right hip and knee  How long has patient had these symptoms:  3 wks   Pharmacy name and phone #:    Would the patient rather a call back or a response via [facility]? Call back   Best Call Back Number:  [phone]  Additional Information: patient called in this afternoon stating after he started on dupixent he has developed hip and knee pain.He read that this could be side affects for taking the medication  He is taking ibuprofen and that is not helping the pain is getting worse. Please call back  [phone]. Thanks paz

## 2025-02-14 NOTE — TELEPHONE ENCOUNTER
Returned pt call spoke about pt having hip and knee pain during day for the past three weeks. Pt explained he has never had issues with his joints and is concerned it might be from starting Dupixent. Pt next injection is due Thursday 02/20/25. Explained to pt provider is not in clinic today, but a message would be sent to provider for recommendation. Provider will be in clinic 02/17/25. Explained to pt that as soon as possible the clinic will reach out to pt regarding providers response. Pt v/u.

## 2025-02-17 NOTE — TELEPHONE ENCOUNTER
Reached out to pt to notify of provider response to could pain in hip be caused by Dupixent. Provider explained joint pain is not common at <1 of 50. Pt v/u and explained he is feeling better and thinks he hurt himself and the pain was not caused by Dupixent. Asked pt if he wishes to continue TX with Dupixent and he does want to continue TX. Advised pt to call clinic with any questions or concerns, pt v/u.

## 2025-04-21 ENCOUNTER — OFFICE VISIT (OUTPATIENT)
Dept: PULMONOLOGY | Facility: CLINIC | Age: 60
End: 2025-04-21
Payer: MEDICARE

## 2025-04-21 DIAGNOSIS — J45.50 SEVERE PERSISTENT ASTHMA, UNCOMPLICATED: ICD-10-CM

## 2025-04-21 DIAGNOSIS — R09.89 CHRONIC SINUS COMPLAINTS: ICD-10-CM

## 2025-04-21 DIAGNOSIS — F41.9 ANXIETY: Primary | ICD-10-CM

## 2025-04-21 PROCEDURE — 98005 SYNCH AUDIO-VIDEO EST LOW 20: CPT | Mod: 95,,, | Performed by: INTERNAL MEDICINE

## 2025-04-21 RX ORDER — BUDESONIDE AND FORMOTEROL FUMARATE DIHYDRATE 160; 4.5 UG/1; UG/1
2 AEROSOL RESPIRATORY (INHALATION) EVERY 12 HOURS
Qty: 10.2 G | Refills: 11 | Status: SHIPPED | OUTPATIENT
Start: 2025-04-21 | End: 2026-04-21

## 2025-04-21 RX ORDER — ATORVASTATIN CALCIUM 20 MG/1
20 TABLET, FILM COATED ORAL DAILY
COMMUNITY

## 2025-04-21 RX ORDER — CETIRIZINE HYDROCHLORIDE 10 MG/1
10 TABLET, FILM COATED ORAL NIGHTLY
Qty: 90 TABLET | Refills: 3 | Status: SHIPPED | OUTPATIENT
Start: 2025-04-21

## 2025-04-21 RX ORDER — AZELASTINE 1 MG/ML
1 SPRAY, METERED NASAL 2 TIMES DAILY
COMMUNITY
Start: 2025-03-12

## 2025-04-21 RX ORDER — AMITRIPTYLINE HYDROCHLORIDE 10 MG/1
20 TABLET, FILM COATED ORAL NIGHTLY PRN
Qty: 60 TABLET | Refills: 11 | Status: SHIPPED | OUTPATIENT
Start: 2025-04-21 | End: 2026-04-21

## 2025-04-21 RX ORDER — FLUTICASONE FUROATE, UMECLIDINIUM BROMIDE AND VILANTEROL TRIFENATATE 200; 62.5; 25 UG/1; UG/1; UG/1
1 POWDER RESPIRATORY (INHALATION)
COMMUNITY
Start: 2025-02-27 | End: 2025-04-21

## 2025-04-21 NOTE — PATIENT INSTRUCTIONS
We discuss excessive foot and hand movement.  You have concerns may be from trelegy.     Dupixent has worked very well last 6 month with breathing much better daily and no prednisone needed.    You used symbicort in past with no issues    Will stop trelegy and order symbicort -- use 2 twice daily    Use prednisone if lungs become unstable        You relate mom had restless legs and did well with elavil , elavil may have helped you as a youth.   Would offer elavil 10 mg  to use 1-2 pills bedtime for nerves if needed..    Re check in a yr.    Dupixent should remain effective.... improve next months

## 2025-04-21 NOTE — PROGRESS NOTES
The patient location is: [facility]  The chief complaint leading to consultation is: sob     Visit type: audiovisual    Face to Face time with patient:  21  21 minutes of total time spent on the encounter, which includes face to face time and non-face to face time preparing to see the patient (eg, review of tests), Obtaining and/or reviewing separately obtained history, Documenting clinical information in the electronic or other health record, Independently interpreting results (not separately reported) and communicating results to the patient/family/caregiver, or Care coordination (not separately reported).         Each patient to whom he or she provides medical services by telemedicine is:  (1) informed of the relationship between the physician and patient and the respective role of any other health care provider with respect to management of the patient; and (2) notified that he or she may decline to receive medical services by telemedicine and may withdraw from such care at any time.    Notes:      4/21/2025    Vimal Cruz  New Patient Consult    Chief Complaint   Patient presents with    Follow-up    COPD       HPI:    4/21/2025 pt on dupixent and has been very stable.  Some suárez but doing more    No recent prednisone.    Pt feels like trelegy giving side effects-- figity feet and fingers tapping.. .  Has buspirone but not use but avoids as dislikes meds and buspar somewhat effective.  Started dupixent nov 2024..      12/18/2024 pt feels dupixent doing well. Uses trelegy daily.  Very concerned re use inhaled steroids.     Feels trelegy may be ppt back pain =- may wish to stay on breo.  Dupixent said to decrease hand arthritic c/o?    Avoided ohtuvyare with suicide side effect  mentioned..      10/22/024 pt worked [employer].  Off smokes 5 yrs  Diagnosed 2020 around covid.  Pt had had chr sinus with abx 2/yr.  Pt has had childhood wheezes and variable short breath since    Pt  "was considered for valves -- offered by one Doc, but ended up  having stem cell trial via [place].      Pt uses prednisone -- could take regular to improve breathing    Trelegy ppt hyperness       Pt having eye sight difficulties and cervical problems-- pt feels eye problems from inhaler.  Eye doc relates    Pt wallks regular 20-25 min nearly daily.    Pt has needed  ativan, used ativan and elavil as child-- mom  had anxiety and mother  from stroke after stopping zyprexia.   Pt was using ativan til mom's death in .  Patient Instructions     Ct chest viewed from 2024.  You should have yearly ct chest for screen.    You have focal emphysema disease and valves should help.  You have asthma copd overlap.    You have asthma -- breathing is  steroid dependant and improves dramatically with steroids.  You have been on controller with breo all year.  You have severe steroid dependant asthma-- you have used prednisone 3 times this year.    Would recommend dupixent for steroid dependant asthma.    Would do bone density in future if uses prednisone more regular.  Also do screens for diabetes..    Would recommend using anticholinergic -- go from breo to trelegy (100 or 200) daily or add Spiriva 2 puffs daily to breo.......    Lung capacity is at least 40% range..    not fatal range.  You are physically and psychologically debilitated    New medication- ohtuvarye- worth a trial if covered-- will order    Use prednisone 20 mg daily for up to 3 days, repeat as  needed.    May use azithromycin for sinus infections.    Anxiety treatment may help?????    Asthma therapy  Use controller daily -- breo or trelegy  Rescue -- use 2 - 4 puff or nebulizer every 4 hrs  Action  plan prednisone as needed--        PFSH:  Past Medical History:   Diagnosis Date    Anxiety     Arthritis     "degenerative bone disease"    COPD (chronic obstructive pulmonary disease)     Emphysema of lung     Heart attack 2017    Hypertension     " Medication intolerance, atorvastatin 20 mg with muscle 04/24/2024         Past Surgical History:   Procedure Laterality Date    ANTERIOR SPINAL FUSION  2016    c5    HERNIA REPAIR      LAPAROSCOPIC REPAIR OF UMBILICAL HERNIA N/A 8/14/2024    Procedure: REPAIR, HERNIA, UMBILICAL, LAPAROSCOPIC;  Surgeon: Roel Bonilla MD;  [facility]: [facility];  Service: General;  Laterality: N/A;     Social History     Tobacco Use    Smoking status: Former     Average packs/day: 1 pack/day for 41.3 years (41.3 ttl pk-yrs)     Types: Cigarettes     Start date: 11/21/1978    Smokeless tobacco: Never   Substance Use Topics    Alcohol use: Not Currently     Comment: socially    Drug use: Never     Family History   Problem Relation Name Age of Onset    Dementia Mother      Stroke Mother  77        after stopping zyprexa    Emphysema Maternal Grandmother  56    Throat cancer Maternal Grandfather      Cancer Maternal Grandfather  87        colon    Dementia Paternal Grandmother      Stroke Paternal Grandfather       Review of patient's allergies indicates:   Allergen Reactions    Amoxicillin Itching     Only if not taken with food    Penicillin Itching     Only if taken without food          Review of Systems:  a review of eleven systems covering constitutional, Eye, HEENT, Psych, Respiratory, Cardiac, GI, , Musculoskeletal, Endocrine, Dermatologic was negative except for pertinent findings as listed ABOVE and below:  pertinent positives as above, rest good        Exam:Comprehensive exam done. There were no vitals taken for this visit.  Exam included Vitals as listed, and patient's appearance and affect and alertness and mood, oral exam for yeast and hygiene and pharynx lesions and Mallapatti (M) score, neck with inspection for jvd and masses and thyroid abnormalities and lymph nodes (supraclavicular and infraclavicular nodes and axillary also examined and noted if abn), chest exam included symmetry and effort and fremitus and percussion  "and auscultation, cardiac exam included rhythm and gallops and murmur and rubs and jvd and edema, abdominal exam for mass and hepatosplenomegaly and tenderness and hernias and bowel sounds, Musculoskeletal exam with muscle tone and posture and mobility/gait and  strength, and skin for rashes and cyanosis and pallor and turgor, extremity for clubbing.  Findings were normal except for pertinent findings listed below:  Anxious, chest is symmetric, no distress, normal percussion, normal fremitus and good normal breath sounds  No edema          Radiographs (ct chest and cxr) reviewed: view by direct vision      Labs reviewed           PFT results reviewed     Spirometry with bronchodilator, lung volume by body box, diffusion capacity measured May 9, 2024. There is severe  airflow obstruction with an FEV1 of 1.46 L or 42% of predicted. The FEV1 improves by 11% following bronchodilator with  12% needed for statistical significance. Total lung capacity was high at 129% predicted with a residual volume of 211% of  predicted -there is severe hyperinflation. Diffusion was only 38% of predicted.  There is severe hyperinflation and airflow obstruction. Bronchodilator response was not statistically significant at 11%.  Clinical correlation recommended. There was no restriction.  (Physician Final: 05/09/2024 06:09PM, Electronically signed by Dr. Kei Nguyen)  Plan:  Clinical impression is apparently straight forward and impression with management as below.    Vimal Sim" was seen today for follow-up and copd.    Diagnoses and all orders for this visit:    Anxiety  -     amitriptyline (ELAVIL) 10 MG tablet; Take 2 tablets (20 mg total) by mouth nightly as needed for Insomnia.    Severe persistent asthma, uncomplicated  -     budesonide-formoterol 160-4.5 mcg (SYMBICORT) 160-4.5 mcg/actuation HFAA; Inhale 2 puffs into the lungs every 12 (twelve) hours. Controller    Chronic sinus complaints  -     ALLERGY RELIEF, CETIRIZINE, 10 " mg tablet; Take 1 tablet (10 mg total) by mouth every evening.          Follow up in about 1 year (around 4/21/2026), or if symptoms worsen or fail to improve.    Discussed with patient above for education the following:      Patient Instructions   We discuss excessive foot and hand movement.  You have concerns may be from trelegy.     Dupixent has worked very well last 6 month with breathing much better daily and no prednisone needed.    You used symbicort in past with no issues    Will stop trelegy and order symbicort -- use 2 twice daily    Use prednisone if lungs become unstable        You relate mom had restless legs and did well with elavil , elavil may have helped you as a youth.   Would offer elavil 10 mg  to use 1-2 pills bedtime for nerves if needed..    Re check in a yr.    Dupixent should remain effective.... improve next months

## 2025-06-23 DIAGNOSIS — Z00.00 ENCOUNTER FOR MEDICARE ANNUAL WELLNESS EXAM: ICD-10-CM

## 2025-07-31 DIAGNOSIS — I10 ESSENTIAL HYPERTENSION: ICD-10-CM

## (undated) DEVICE — DRESSING TRANS 4X4 TEGADERM

## (undated) DEVICE — SOL CLEARIFY VISUALIZATION LAP

## (undated) DEVICE — ADHESIVE DERMABOND ADVANCED

## (undated) DEVICE — Device

## (undated) DEVICE — GLOVE SENSICARE PI GRN 7

## (undated) DEVICE — PAD CURAD NONADH 3X4IN

## (undated) DEVICE — GLOVE SENSICARE PI SURG 7

## (undated) DEVICE — CANISTER SUCTION RIGID 3000CC

## (undated) DEVICE — DEVICE FIXATION ABSTACK 30

## (undated) DEVICE — TROCAR BLDELSS 5X100 STABILITY

## (undated) DEVICE — GAUZE SPONGE BULKEE 6X6.75IN

## (undated) DEVICE — SHEARS HARMONIC CRVD TIP 36CM

## (undated) DEVICE — TUBING PNEUMO

## (undated) DEVICE — GLOVE BIOGEL ECLIPSE SZ 7.5

## (undated) DEVICE — PENCIL ZIP PEN SMOKE EVAC 10FT

## (undated) DEVICE — TUBING LAP SMOKE EVAC 6.5MM

## (undated) DEVICE — ENDO GRASPER ETHICON 5DSG

## (undated) DEVICE — ELECTRODE REM PLYHSV RETURN 9

## (undated) DEVICE — BINDER ABDOM 4PANEL 12IN LG/XL

## (undated) DEVICE — DRAPE ABDOMINAL TIBURON 14X11

## (undated) DEVICE — DRESSING TRANS 2X2 TEGADERM

## (undated) DEVICE — TROCAR ENDOPATH XCEL 11MM 10CM

## (undated) DEVICE — STAPLER SKIN ROTATING HEAD

## (undated) DEVICE — CANNULA ENDOPATH XCEL 5X100MM